# Patient Record
Sex: MALE | Race: ASIAN | NOT HISPANIC OR LATINO
[De-identification: names, ages, dates, MRNs, and addresses within clinical notes are randomized per-mention and may not be internally consistent; named-entity substitution may affect disease eponyms.]

---

## 2017-03-28 ENCOUNTER — NON-APPOINTMENT (OUTPATIENT)
Age: 47
End: 2017-03-28

## 2017-03-28 ENCOUNTER — APPOINTMENT (OUTPATIENT)
Dept: CARDIOLOGY | Facility: HOSPITAL | Age: 47
End: 2017-03-28

## 2017-03-28 ENCOUNTER — OUTPATIENT (OUTPATIENT)
Dept: OUTPATIENT SERVICES | Facility: HOSPITAL | Age: 47
LOS: 1 days | End: 2017-03-28
Payer: MEDICAID

## 2017-03-28 VITALS
SYSTOLIC BLOOD PRESSURE: 134 MMHG | HEIGHT: 68 IN | OXYGEN SATURATION: 99 % | HEART RATE: 71 BPM | DIASTOLIC BLOOD PRESSURE: 88 MMHG

## 2017-03-28 DIAGNOSIS — Z95.2 PRESENCE OF PROSTHETIC HEART VALVE: Chronic | ICD-10-CM

## 2017-03-28 DIAGNOSIS — Z98.89 OTHER SPECIFIED POSTPROCEDURAL STATES: Chronic | ICD-10-CM

## 2017-03-28 DIAGNOSIS — Z86.018 PERSONAL HISTORY OF OTHER BENIGN NEOPLASM: Chronic | ICD-10-CM

## 2017-03-28 DIAGNOSIS — I25.10 ATHEROSCLEROTIC HEART DISEASE OF NATIVE CORONARY ARTERY WITHOUT ANGINA PECTORIS: ICD-10-CM

## 2017-03-28 PROCEDURE — 93005 ELECTROCARDIOGRAM TRACING: CPT

## 2017-03-28 PROCEDURE — G0463: CPT

## 2017-03-29 DIAGNOSIS — E11.9 TYPE 2 DIABETES MELLITUS WITHOUT COMPLICATIONS: ICD-10-CM

## 2017-03-29 DIAGNOSIS — E78.5 HYPERLIPIDEMIA, UNSPECIFIED: ICD-10-CM

## 2017-06-20 ENCOUNTER — APPOINTMENT (OUTPATIENT)
Dept: CARDIOLOGY | Facility: HOSPITAL | Age: 47
End: 2017-06-20

## 2017-06-20 ENCOUNTER — OUTPATIENT (OUTPATIENT)
Dept: OUTPATIENT SERVICES | Facility: HOSPITAL | Age: 47
LOS: 1 days | End: 2017-06-20
Payer: MEDICAID

## 2017-06-20 VITALS — DIASTOLIC BLOOD PRESSURE: 87 MMHG | SYSTOLIC BLOOD PRESSURE: 132 MMHG | HEART RATE: 73 BPM | OXYGEN SATURATION: 95 %

## 2017-06-20 DIAGNOSIS — I25.10 ATHEROSCLEROTIC HEART DISEASE OF NATIVE CORONARY ARTERY WITHOUT ANGINA PECTORIS: ICD-10-CM

## 2017-06-20 DIAGNOSIS — Z86.018 PERSONAL HISTORY OF OTHER BENIGN NEOPLASM: Chronic | ICD-10-CM

## 2017-06-20 DIAGNOSIS — Z95.2 PRESENCE OF PROSTHETIC HEART VALVE: Chronic | ICD-10-CM

## 2017-06-20 DIAGNOSIS — Z98.89 OTHER SPECIFIED POSTPROCEDURAL STATES: Chronic | ICD-10-CM

## 2017-06-20 PROCEDURE — G0463: CPT

## 2017-06-22 DIAGNOSIS — I08.0 RHEUMATIC DISORDERS OF BOTH MITRAL AND AORTIC VALVES: ICD-10-CM

## 2017-06-22 DIAGNOSIS — E11.9 TYPE 2 DIABETES MELLITUS WITHOUT COMPLICATIONS: ICD-10-CM

## 2017-06-22 DIAGNOSIS — I48.92 UNSPECIFIED ATRIAL FLUTTER: ICD-10-CM

## 2017-09-13 ENCOUNTER — OUTPATIENT (OUTPATIENT)
Dept: OUTPATIENT SERVICES | Facility: HOSPITAL | Age: 47
LOS: 1 days | End: 2017-09-13
Payer: MEDICAID

## 2017-09-13 ENCOUNTER — APPOINTMENT (OUTPATIENT)
Dept: CT IMAGING | Facility: IMAGING CENTER | Age: 47
End: 2017-09-13
Payer: MEDICAID

## 2017-09-13 DIAGNOSIS — Z95.2 PRESENCE OF PROSTHETIC HEART VALVE: Chronic | ICD-10-CM

## 2017-09-13 DIAGNOSIS — Z98.89 OTHER SPECIFIED POSTPROCEDURAL STATES: Chronic | ICD-10-CM

## 2017-09-13 DIAGNOSIS — Z86.018 PERSONAL HISTORY OF OTHER BENIGN NEOPLASM: Chronic | ICD-10-CM

## 2017-09-13 DIAGNOSIS — D35.00 BENIGN NEOPLASM OF UNSPECIFIED ADRENAL GLAND: ICD-10-CM

## 2017-09-13 PROCEDURE — 82565 ASSAY OF CREATININE: CPT

## 2017-09-13 PROCEDURE — 74177 CT ABD & PELVIS W/CONTRAST: CPT

## 2017-09-13 PROCEDURE — 74177 CT ABD & PELVIS W/CONTRAST: CPT | Mod: 26

## 2017-09-13 PROCEDURE — 71260 CT THORAX DX C+: CPT | Mod: 26

## 2017-09-13 PROCEDURE — 71260 CT THORAX DX C+: CPT

## 2017-09-21 ENCOUNTER — APPOINTMENT (OUTPATIENT)
Dept: SURGICAL ONCOLOGY | Facility: CLINIC | Age: 47
End: 2017-09-21
Payer: MEDICAID

## 2017-09-21 VITALS
BODY MASS INDEX: 32.74 KG/M2 | HEART RATE: 61 BPM | SYSTOLIC BLOOD PRESSURE: 142 MMHG | RESPIRATION RATE: 16 BRPM | DIASTOLIC BLOOD PRESSURE: 93 MMHG | HEIGHT: 68 IN | OXYGEN SATURATION: 96 % | WEIGHT: 216 LBS

## 2017-09-21 PROCEDURE — 99214 OFFICE O/P EST MOD 30 MIN: CPT

## 2017-09-26 ENCOUNTER — APPOINTMENT (OUTPATIENT)
Dept: CARDIOLOGY | Facility: HOSPITAL | Age: 47
End: 2017-09-26

## 2017-09-26 ENCOUNTER — APPOINTMENT (OUTPATIENT)
Dept: SURGICAL ONCOLOGY | Facility: CLINIC | Age: 47
End: 2017-09-26

## 2017-09-26 ENCOUNTER — OUTPATIENT (OUTPATIENT)
Dept: OUTPATIENT SERVICES | Facility: HOSPITAL | Age: 47
LOS: 1 days | End: 2017-09-26
Payer: MEDICAID

## 2017-09-26 VITALS
OXYGEN SATURATION: 96 % | HEART RATE: 64 BPM | DIASTOLIC BLOOD PRESSURE: 88 MMHG | HEIGHT: 68 IN | SYSTOLIC BLOOD PRESSURE: 131 MMHG

## 2017-09-26 DIAGNOSIS — I50.23 ACUTE ON CHRONIC SYSTOLIC (CONGESTIVE) HEART FAILURE: ICD-10-CM

## 2017-09-26 DIAGNOSIS — Z86.018 PERSONAL HISTORY OF OTHER BENIGN NEOPLASM: Chronic | ICD-10-CM

## 2017-09-26 DIAGNOSIS — Z95.2 PRESENCE OF PROSTHETIC HEART VALVE: Chronic | ICD-10-CM

## 2017-09-26 DIAGNOSIS — Z98.89 OTHER SPECIFIED POSTPROCEDURAL STATES: Chronic | ICD-10-CM

## 2017-09-26 DIAGNOSIS — D35.00 BENIGN NEOPLASM OF UNSPECIFIED ADRENAL GLAND: ICD-10-CM

## 2017-09-26 DIAGNOSIS — I25.10 ATHEROSCLEROTIC HEART DISEASE OF NATIVE CORONARY ARTERY WITHOUT ANGINA PECTORIS: ICD-10-CM

## 2017-09-26 DIAGNOSIS — I48.92 UNSPECIFIED ATRIAL FLUTTER: ICD-10-CM

## 2017-09-26 PROCEDURE — G0463: CPT

## 2017-09-26 RX ORDER — INSULIN GLARGINE 100 [IU]/ML
INJECTION, SOLUTION SUBCUTANEOUS
Refills: 0 | Status: ACTIVE | COMMUNITY

## 2017-09-26 RX ORDER — FERROUS SULFATE 325(65) MG
300 (60 FE) TABLET ORAL
Refills: 0 | Status: ACTIVE | COMMUNITY

## 2017-09-26 RX ORDER — SIMVASTATIN 20 MG/1
20 TABLET, FILM COATED ORAL
Qty: 30 | Refills: 3 | Status: DISCONTINUED | COMMUNITY
Start: 2017-06-20 | End: 2017-09-26

## 2018-02-06 ENCOUNTER — NON-APPOINTMENT (OUTPATIENT)
Age: 48
End: 2018-02-06

## 2018-02-06 ENCOUNTER — APPOINTMENT (OUTPATIENT)
Dept: CARDIOLOGY | Facility: HOSPITAL | Age: 48
End: 2018-02-06

## 2018-02-06 ENCOUNTER — OUTPATIENT (OUTPATIENT)
Dept: OUTPATIENT SERVICES | Facility: HOSPITAL | Age: 48
LOS: 1 days | End: 2018-02-06
Payer: MEDICAID

## 2018-02-06 VITALS — SYSTOLIC BLOOD PRESSURE: 137 MMHG | DIASTOLIC BLOOD PRESSURE: 89 MMHG | HEART RATE: 73 BPM | OXYGEN SATURATION: 98 %

## 2018-02-06 DIAGNOSIS — D35.00 BENIGN NEOPLASM OF UNSPECIFIED ADRENAL GLAND: ICD-10-CM

## 2018-02-06 DIAGNOSIS — Z98.89 OTHER SPECIFIED POSTPROCEDURAL STATES: Chronic | ICD-10-CM

## 2018-02-06 DIAGNOSIS — Z95.2 PRESENCE OF PROSTHETIC HEART VALVE: Chronic | ICD-10-CM

## 2018-02-06 DIAGNOSIS — I25.10 ATHEROSCLEROTIC HEART DISEASE OF NATIVE CORONARY ARTERY WITHOUT ANGINA PECTORIS: ICD-10-CM

## 2018-02-06 DIAGNOSIS — Z86.018 PERSONAL HISTORY OF OTHER BENIGN NEOPLASM: Chronic | ICD-10-CM

## 2018-02-06 PROCEDURE — G0463: CPT

## 2018-02-06 PROCEDURE — 93005 ELECTROCARDIOGRAM TRACING: CPT

## 2018-02-08 DIAGNOSIS — I48.92 UNSPECIFIED ATRIAL FLUTTER: ICD-10-CM

## 2018-02-08 DIAGNOSIS — D35.00 BENIGN NEOPLASM OF UNSPECIFIED ADRENAL GLAND: ICD-10-CM

## 2018-02-08 DIAGNOSIS — I50.23 ACUTE ON CHRONIC SYSTOLIC (CONGESTIVE) HEART FAILURE: ICD-10-CM

## 2018-08-07 ENCOUNTER — APPOINTMENT (OUTPATIENT)
Dept: CARDIOLOGY | Facility: HOSPITAL | Age: 48
End: 2018-08-07

## 2018-08-07 ENCOUNTER — OUTPATIENT (OUTPATIENT)
Dept: OUTPATIENT SERVICES | Facility: HOSPITAL | Age: 48
LOS: 1 days | End: 2018-08-07
Payer: MEDICAID

## 2018-08-07 ENCOUNTER — NON-APPOINTMENT (OUTPATIENT)
Age: 48
End: 2018-08-07

## 2018-08-07 VITALS — DIASTOLIC BLOOD PRESSURE: 81 MMHG | SYSTOLIC BLOOD PRESSURE: 121 MMHG | HEART RATE: 70 BPM | OXYGEN SATURATION: 97 %

## 2018-08-07 DIAGNOSIS — Z95.2 PRESENCE OF PROSTHETIC HEART VALVE: Chronic | ICD-10-CM

## 2018-08-07 DIAGNOSIS — Z86.018 PERSONAL HISTORY OF OTHER BENIGN NEOPLASM: Chronic | ICD-10-CM

## 2018-08-07 DIAGNOSIS — Z98.89 OTHER SPECIFIED POSTPROCEDURAL STATES: Chronic | ICD-10-CM

## 2018-08-07 DIAGNOSIS — I25.10 ATHEROSCLEROTIC HEART DISEASE OF NATIVE CORONARY ARTERY WITHOUT ANGINA PECTORIS: ICD-10-CM

## 2018-08-07 PROCEDURE — 93005 ELECTROCARDIOGRAM TRACING: CPT

## 2018-08-07 PROCEDURE — G0463: CPT

## 2018-08-07 RX ORDER — PRAVASTATIN SODIUM 40 MG/1
40 TABLET ORAL
Qty: 90 | Refills: 3 | Status: DISCONTINUED | COMMUNITY
Start: 2017-09-26 | End: 2018-08-07

## 2018-08-08 DIAGNOSIS — I48.92 UNSPECIFIED ATRIAL FLUTTER: ICD-10-CM

## 2018-08-08 DIAGNOSIS — I50.23 ACUTE ON CHRONIC SYSTOLIC (CONGESTIVE) HEART FAILURE: ICD-10-CM

## 2018-09-04 ENCOUNTER — APPOINTMENT (OUTPATIENT)
Dept: CT IMAGING | Facility: IMAGING CENTER | Age: 48
End: 2018-09-04

## 2018-11-06 ENCOUNTER — APPOINTMENT (OUTPATIENT)
Dept: CARDIOLOGY | Facility: HOSPITAL | Age: 48
End: 2018-11-06

## 2018-11-06 ENCOUNTER — OUTPATIENT (OUTPATIENT)
Dept: OUTPATIENT SERVICES | Facility: HOSPITAL | Age: 48
LOS: 1 days | End: 2018-11-06
Payer: MEDICAID

## 2018-11-06 ENCOUNTER — NON-APPOINTMENT (OUTPATIENT)
Age: 48
End: 2018-11-06

## 2018-11-06 VITALS
HEART RATE: 73 BPM | OXYGEN SATURATION: 98 % | BODY MASS INDEX: 37.99 KG/M2 | HEIGHT: 65 IN | WEIGHT: 228 LBS | SYSTOLIC BLOOD PRESSURE: 146 MMHG | DIASTOLIC BLOOD PRESSURE: 88 MMHG

## 2018-11-06 VITALS — DIASTOLIC BLOOD PRESSURE: 84 MMHG | SYSTOLIC BLOOD PRESSURE: 136 MMHG

## 2018-11-06 DIAGNOSIS — Z95.2 PRESENCE OF PROSTHETIC HEART VALVE: Chronic | ICD-10-CM

## 2018-11-06 DIAGNOSIS — Z98.89 OTHER SPECIFIED POSTPROCEDURAL STATES: Chronic | ICD-10-CM

## 2018-11-06 DIAGNOSIS — Z86.018 PERSONAL HISTORY OF OTHER BENIGN NEOPLASM: Chronic | ICD-10-CM

## 2018-11-06 DIAGNOSIS — I25.10 ATHEROSCLEROTIC HEART DISEASE OF NATIVE CORONARY ARTERY WITHOUT ANGINA PECTORIS: ICD-10-CM

## 2018-11-06 PROCEDURE — G0463: CPT

## 2018-11-06 PROCEDURE — 93005 ELECTROCARDIOGRAM TRACING: CPT

## 2018-11-06 NOTE — PHYSICAL EXAM
[General Appearance - Well Developed] : well developed [Normal Appearance] : normal appearance [Well Groomed] : well groomed [General Appearance - Well Nourished] : well nourished [No Deformities] : no deformities [General Appearance - In No Acute Distress] : no acute distress [Normal Conjunctiva] : the conjunctiva exhibited no abnormalities [Eyelids - No Xanthelasma] : the eyelids demonstrated no xanthelasmas [Normal Oral Mucosa] : normal oral mucosa [No Oral Pallor] : no oral pallor [No Oral Cyanosis] : no oral cyanosis [Normal Jugular Venous A Waves Present] : normal jugular venous A waves present [Normal Jugular Venous V Waves Present] : normal jugular venous V waves present [No Jugular Venous Cruz A Waves] : no jugular venous cruz A waves [Respiration, Rhythm And Depth] : normal respiratory rhythm and effort [Exaggerated Use Of Accessory Muscles For Inspiration] : no accessory muscle use [Auscultation Breath Sounds / Voice Sounds] : lungs were clear to auscultation bilaterally [Heart Sounds] : normal S1 and S2 [Abdomen Soft] : soft [Abdomen Tenderness] : non-tender [Abdomen Mass (___ Cm)] : no abdominal mass palpated [Abnormal Walk] : normal gait [Gait - Sufficient For Exercise Testing] : the gait was sufficient for exercise testing [Nail Clubbing] : no clubbing of the fingernails [Cyanosis, Localized] : no localized cyanosis [Petechial Hemorrhages (___cm)] : no petechial hemorrhages [Skin Color & Pigmentation] : normal skin color and pigmentation [] : no rash [No Venous Stasis] : no venous stasis [Skin Lesions] : no skin lesions [No Skin Ulcers] : no skin ulcer [No Xanthoma] : no  xanthoma was observed [Oriented To Time, Place, And Person] : oriented to person, place, and time [Affect] : the affect was normal [Mood] : the mood was normal [No Anxiety] : not feeling anxious [FreeTextEntry1] : 3/6 systolic murmur heard best at the RUSB, soft diastolic murmur heard best at the apex

## 2018-11-06 NOTE — REASON FOR VISIT
[Follow-Up - Clinic] : a clinic follow-up of [FreeTextEntry1] : severe MR and AI s/p bioprosthetic AVR, MVR in 9/2017, atrial flutter s/p ablation(1/2018)

## 2018-11-06 NOTE — HISTORY OF PRESENT ILLNESS
[FreeTextEntry1] : Mr. Zelaya is a 49 yo M with a PMH significant for IDDM, HLD, HCV, pheochromocytoma s/p adrenalectomy, severe MR and AI s/p bioprosthetic AVR, MVR in 9/2017, atrial flutter s/p ablation(1/2018) who presents for follow up. \par \par Currently, patient is without complaints and states he is feeling much improved. He has been working more often because he has been feeling better and he has also been walking for 20 min per day without dyspnea. He denies chest pain, SOB, orthopnea, PND, palpitations. \par \par He recently saw his endocrinologist for his DM and his insulins were discontinued. He was started on metformin, glimepiride, and pioglitazone.\par \par Recent labs:\par \par TC 96, , HDL 29, LDL 32, Creatinine 1\par \par From prior notes: "He was discharged from [facility] in 9/2015 after a prolonged hospitalization secondary to PNA. His stay was complicated by septic and cardiogenic shock with incidental discovery of pheochromocytoma, severe mitral regurgitation and aortic regurgitation that led to hemodynamic instability secondary to AF with RVR. He was cardioverted and taken for subsequent pheochromocytoma removal as well as AVR/MVR. He is status post pheo removal as well as MVR/AVR. In 1/2016, he had atrial flutter and is now s/p aflutter ablation. He has been doing well post ablation. \par \par Had recent CT on 9/13/2017 s/p adrenalectomy with stable pulmonary nodules and no recurrence of adrenal disease."

## 2018-11-09 DIAGNOSIS — I48.92 UNSPECIFIED ATRIAL FLUTTER: ICD-10-CM

## 2018-11-09 DIAGNOSIS — I50.23 ACUTE ON CHRONIC SYSTOLIC (CONGESTIVE) HEART FAILURE: ICD-10-CM

## 2018-11-09 DIAGNOSIS — Z95.3 PRESENCE OF XENOGENIC HEART VALVE: ICD-10-CM

## 2018-11-09 DIAGNOSIS — Z86.39 PERSONAL HISTORY OF OTHER ENDOCRINE, NUTRITIONAL AND METABOLIC DISEASE: ICD-10-CM

## 2019-02-12 ENCOUNTER — APPOINTMENT (OUTPATIENT)
Dept: CARDIOLOGY | Facility: HOSPITAL | Age: 49
End: 2019-02-12

## 2019-02-12 ENCOUNTER — OUTPATIENT (OUTPATIENT)
Dept: OUTPATIENT SERVICES | Facility: HOSPITAL | Age: 49
LOS: 1 days | End: 2019-02-12
Payer: MEDICAID

## 2019-02-12 ENCOUNTER — NON-APPOINTMENT (OUTPATIENT)
Age: 49
End: 2019-02-12

## 2019-02-12 VITALS — HEART RATE: 66 BPM | DIASTOLIC BLOOD PRESSURE: 82 MMHG | OXYGEN SATURATION: 95 % | SYSTOLIC BLOOD PRESSURE: 146 MMHG

## 2019-02-12 DIAGNOSIS — I48.91 UNSPECIFIED ATRIAL FIBRILLATION: ICD-10-CM

## 2019-02-12 DIAGNOSIS — Z98.89 OTHER SPECIFIED POSTPROCEDURAL STATES: Chronic | ICD-10-CM

## 2019-02-12 DIAGNOSIS — I34.9 NONRHEUMATIC MITRAL VALVE DISORDER, UNSPECIFIED: ICD-10-CM

## 2019-02-12 DIAGNOSIS — I35.9 NONRHEUMATIC AORTIC VALVE DISORDER, UNSPECIFIED: ICD-10-CM

## 2019-02-12 DIAGNOSIS — E66.9 OBESITY, UNSPECIFIED: ICD-10-CM

## 2019-02-12 DIAGNOSIS — Z86.39 PERSONAL HISTORY OF OTHER ENDOCRINE, NUTRITIONAL AND METABOLIC DISEASE: ICD-10-CM

## 2019-02-12 DIAGNOSIS — E11.9 TYPE 2 DIABETES MELLITUS WITHOUT COMPLICATIONS: ICD-10-CM

## 2019-02-12 DIAGNOSIS — I25.10 ATHEROSCLEROTIC HEART DISEASE OF NATIVE CORONARY ARTERY WITHOUT ANGINA PECTORIS: ICD-10-CM

## 2019-02-12 DIAGNOSIS — B18.2 CHRONIC VIRAL HEPATITIS C: ICD-10-CM

## 2019-02-12 DIAGNOSIS — Z95.2 PRESENCE OF PROSTHETIC HEART VALVE: Chronic | ICD-10-CM

## 2019-02-12 DIAGNOSIS — I10 ESSENTIAL (PRIMARY) HYPERTENSION: ICD-10-CM

## 2019-02-12 DIAGNOSIS — Z86.018 PERSONAL HISTORY OF OTHER BENIGN NEOPLASM: Chronic | ICD-10-CM

## 2019-02-12 DIAGNOSIS — I50.9 HEART FAILURE, UNSPECIFIED: ICD-10-CM

## 2019-02-12 DIAGNOSIS — I50.31 ACUTE DIASTOLIC (CONGESTIVE) HEART FAILURE: ICD-10-CM

## 2019-02-12 PROCEDURE — G0463: CPT

## 2019-02-12 PROCEDURE — 93005 ELECTROCARDIOGRAM TRACING: CPT

## 2019-02-15 NOTE — HISTORY OF PRESENT ILLNESS
[FreeTextEntry1] : Mr. Zelaya is a 47 yo M with a PMH significant for IDDM, HLD, HCV, pheochromocytoma s/p adrenalectomy, severe MR and AI s/p bioprosthetic AVR, MVR in 9/2017, atrial flutter s/p ablation(1/2018) who presents for follow up. \par \par He was last seen in November and at that time felt well and no changes to his regimen were made.\par \par He currently only complains of a multiple month history of a dry cough. He spoke to his PMD and it was suggested that it may be 2/2 his enalapril. Cough is nonproductive and not associated with fevers or any other symptoms. He otherwise denies complaints and is back to work and currently walking about 20-30 minutes per day.\par \par From prior notes: "He was discharged from [facility] in 9/2015 after a prolonged hospitalization secondary to PNA. His stay was complicated by septic and cardiogenic shock with incidental discovery of pheochromocytoma, severe mitral regurgitation and aortic regurgitation that led to hemodynamic instability secondary to AF with RVR. He was cardioverted and taken for subsequent pheochromocytoma removal as well as AVR/MVR. He is status post pheo removal as well as MVR/AVR. In 1/2016, he had atrial flutter and is now s/p aflutter ablation. He has been doing well post ablation. \par \par Had recent CT on 9/13/2017 s/p adrenalectomy with stable pulmonary nodules and no recurrence of adrenal disease." \par \par Recent labs:\par 1/8/19\par total chol 99, , HDL 29, LDL 45\par creatinine 0.97, K 4.8\par reported A1C ~7s\par \par Current meds:\par [id number] mg daily\par glimepiride 4mg daily\par januvia 100mg\par metformin 750mg daily\par warfarin 7.5mg\par atorvastatin 40mg \par enalapril 5mg\par metoprolol 100mg\par

## 2019-05-21 ENCOUNTER — MEDICATION RENEWAL (OUTPATIENT)
Age: 49
End: 2019-05-21

## 2019-07-09 ENCOUNTER — APPOINTMENT (OUTPATIENT)
Dept: CARDIOLOGY | Facility: HOSPITAL | Age: 49
End: 2019-07-09

## 2019-07-10 ENCOUNTER — NON-APPOINTMENT (OUTPATIENT)
Age: 49
End: 2019-07-10

## 2019-07-10 ENCOUNTER — APPOINTMENT (OUTPATIENT)
Dept: CARDIOLOGY | Facility: HOSPITAL | Age: 49
End: 2019-07-10

## 2019-07-10 ENCOUNTER — OUTPATIENT (OUTPATIENT)
Dept: OUTPATIENT SERVICES | Facility: HOSPITAL | Age: 49
LOS: 1 days | End: 2019-07-10
Payer: MEDICAID

## 2019-07-10 VITALS
SYSTOLIC BLOOD PRESSURE: 131 MMHG | HEART RATE: 72 BPM | DIASTOLIC BLOOD PRESSURE: 83 MMHG | WEIGHT: 239 LBS | BODY MASS INDEX: 39.77 KG/M2 | OXYGEN SATURATION: 99 %

## 2019-07-10 DIAGNOSIS — Z86.018 PERSONAL HISTORY OF OTHER BENIGN NEOPLASM: Chronic | ICD-10-CM

## 2019-07-10 DIAGNOSIS — Z98.89 OTHER SPECIFIED POSTPROCEDURAL STATES: Chronic | ICD-10-CM

## 2019-07-10 DIAGNOSIS — Z82.49 FAMILY HISTORY OF ISCHEMIC HEART DISEASE AND OTHER DISEASES OF THE CIRCULATORY SYSTEM: ICD-10-CM

## 2019-07-10 DIAGNOSIS — Z87.891 PERSONAL HISTORY OF NICOTINE DEPENDENCE: ICD-10-CM

## 2019-07-10 DIAGNOSIS — Z83.3 FAMILY HISTORY OF DIABETES MELLITUS: ICD-10-CM

## 2019-07-10 DIAGNOSIS — Z86.79 PERSONAL HISTORY OF OTHER DISEASES OF THE CIRCULATORY SYSTEM: ICD-10-CM

## 2019-07-10 DIAGNOSIS — I25.10 ATHEROSCLEROTIC HEART DISEASE OF NATIVE CORONARY ARTERY WITHOUT ANGINA PECTORIS: ICD-10-CM

## 2019-07-10 DIAGNOSIS — Z95.2 PRESENCE OF PROSTHETIC HEART VALVE: Chronic | ICD-10-CM

## 2019-07-10 PROCEDURE — 93005 ELECTROCARDIOGRAM TRACING: CPT

## 2019-07-10 PROCEDURE — G0463: CPT

## 2019-07-10 RX ORDER — LOSARTAN POTASSIUM 50 MG/1
50 TABLET, FILM COATED ORAL DAILY
Qty: 30 | Refills: 3 | Status: DISCONTINUED | COMMUNITY
Start: 2019-02-12 | End: 2019-07-10

## 2019-07-12 DIAGNOSIS — R06.02 SHORTNESS OF BREATH: ICD-10-CM

## 2019-07-17 NOTE — PHYSICAL EXAM
[General Appearance - Well Developed] : well developed [Normal Appearance] : normal appearance [General Appearance - Well Nourished] : well nourished [Normal Conjunctiva] : the conjunctiva exhibited no abnormalities [Normal Oral Mucosa] : normal oral mucosa [Respiration, Rhythm And Depth] : normal respiratory rhythm and effort [] : no respiratory distress [Heart Sounds] : normal S1 and S2 [Auscultation Breath Sounds / Voice Sounds] : lungs were clear to auscultation bilaterally [Edema] : no peripheral edema present [Abdomen Soft] : soft [Abdomen Tenderness] : non-tender [Abnormal Walk] : normal gait [Cyanosis, Localized] : no localized cyanosis [Nail Clubbing] : no clubbing of the fingernails [Oriented To Time, Place, And Person] : oriented to person, place, and time [FreeTextEntry1] : 3/6 systolic murmur heard best at the RUSB, soft diastolic murmur heard best at the apex

## 2019-07-17 NOTE — HISTORY OF PRESENT ILLNESS
[FreeTextEntry1] : 49 yo M with DM2, HLD, HCV, pheochromocytoma s/p adrenalectomy, severe MR and AI s/p bioprosthetic AVR, MVR in 9/2017, atrial flutter s/p ablation (1/2018) who presents for follow up. \par \par He was last seen in Feb and at that time had cough and lisinopril was changed to losartan.\par \par Today patient notes he 'felt bad' on losartan and was put back on enalapril by his PCP. He has had no cough on the enalapril and would like to continue. He has been doing well - walks for 30min 4-5x/week and lifts light weights as well. No CP, palp, dizziness, orthopnea, PND, MARISOL. He does note some SOB when walking 'when the weather is hot' but otherwise doing okay.\par \par From prior notes: "He was discharged from [facility] in 9/2015 after a prolonged hospitalization secondary to PNA. His stay was complicated by septic and cardiogenic shock with incidental discovery of pheochromocytoma, severe mitral regurgitation and aortic regurgitation that led to hemodynamic instability secondary to AF with RVR. He was cardioverted and taken for subsequent pheochromocytoma removal as well as AVR/MVR. He is status post pheo removal as well as MVR/AVR. In 1/2016, he had atrial flutter and is now s/p aflutter ablation. He has been doing well post ablation. \par \par Had CT on 9/13/2017 s/p adrenalectomy with stable pulmonary nodules and no recurrence of adrenal disease." \par \par Recent labs:\par 1/2019\par total chol 99, , HDL 29, LDL 45\par creatinine 0.97, K 4.8\par A1C 7.3\par \par Current meds:\par asa 81mg\par warfarin 7.5mg\par atorvastatin 80mg\par enalapril 5mg BID\par metoprolol 100mg\par

## 2019-07-17 NOTE — DISCUSSION/SUMMARY
[FreeTextEntry1] : 47 yo M with DM2, HLD, HCV, pheochromocytoma s/p adrenalectomy, severe MR and AI s/p bioprosthetic AVR, MVR in 9/2015, atrial flutter s/p ablation (1/2018) who presents for follow up. \par \par Sev MR/AI s/p biophostetic AVR/MVR in 9/2015\par - unknown etiology, possible rheumatic disease given he was born in [place]\par - notes mild SOB w/ exertion when the weather is hot but no CP, palp, dizziness\par - will obtain stress echo to evaluate for ischemia as well as valvular function\par \par Aflutter s/p ablation 1/2018\par - currently NSR, was diagnosed in setting of pheochromocytoma now s/p adrenalectomy\par - will place 2 week monitor assess for AF burden\par - consider d/c anticoagulation pending results of monitor if has no further AF as risk of AC might be higher than stroke risk\par \par HTN\par - BP today 130/80, ideally would like <120/80\par - c/w metop succ 100d and enalapril 5mg BID\par - will increase enalapril at next visit if necc\par \par HLD\par - lipid panel from 1/2019, repeat labs pending w/ PCP - pt to bring to next visit\par - c/w atorva 80mg daily\par \par \par Discussed with Dr. Bowman.\par \par RTC in 3 mo.\par \par Chelsy Keller MD\par Cardiology Fellow

## 2019-07-17 NOTE — DISCUSSION/SUMMARY
[FreeTextEntry1] : 49 yo M with DM2, HLD, HCV, pheochromocytoma s/p adrenalectomy, severe MR and AI s/p bioprosthetic AVR, MVR in 9/2015, atrial flutter s/p ablation (1/2018) who presents for follow up. \par \par Sev MR/AI s/p biophostetic AVR/MVR in 9/2015\par - unknown etiology, possible rheumatic disease given he was born in [place]\par - notes mild SOB w/ exertion when the weather is hot but no CP, palp, dizziness\par - will obtain stress echo to evaluate for ischemia as well as valvular function\par \par Aflutter s/p ablation 1/2018\par - currently NSR, was diagnosed in setting of pheochromocytoma now s/p adrenalectomy\par - will place 2 week monitor assess for AF burden\par - consider d/c anticoagulation pending results of monitor if has no further AF as risk of AC might be higher than stroke risk\par \par HTN\par - BP today 130/80, ideally would like <120/80\par - c/w metop succ 100d and enalapril 5mg BID\par - will increase enalapril at next visit if necc\par \par HLD\par - lipid panel from 1/2019, repeat labs pending w/ PCP - pt to bring to next visit\par - c/w atorva 80mg daily\par \par \par Discussed with Dr. Bowman.\par \par RTC in 3 mo.\par \par Chelsy Keller MD\par Cardiology Fellow

## 2019-07-17 NOTE — PHYSICAL EXAM
[General Appearance - Well Developed] : well developed [Normal Appearance] : normal appearance [General Appearance - Well Nourished] : well nourished [Normal Conjunctiva] : the conjunctiva exhibited no abnormalities [Normal Oral Mucosa] : normal oral mucosa [] : no respiratory distress [Respiration, Rhythm And Depth] : normal respiratory rhythm and effort [Auscultation Breath Sounds / Voice Sounds] : lungs were clear to auscultation bilaterally [Heart Sounds] : normal S1 and S2 [Edema] : no peripheral edema present [Abdomen Soft] : soft [Abdomen Tenderness] : non-tender [Abnormal Walk] : normal gait [Nail Clubbing] : no clubbing of the fingernails [Cyanosis, Localized] : no localized cyanosis [Oriented To Time, Place, And Person] : oriented to person, place, and time [FreeTextEntry1] : 3/6 systolic murmur heard best at the RUSB, soft diastolic murmur heard best at the apex

## 2019-07-17 NOTE — HISTORY OF PRESENT ILLNESS
[FreeTextEntry1] : 47 yo M with DM2, HLD, HCV, pheochromocytoma s/p adrenalectomy, severe MR and AI s/p bioprosthetic AVR, MVR in 9/2017, atrial flutter s/p ablation (1/2018) who presents for follow up. \par \par He was last seen in Feb and at that time had cough and lisinopril was changed to losartan.\par \par Today patient notes he 'felt bad' on losartan and was put back on enalapril by his PCP. He has had no cough on the enalapril and would like to continue. He has been doing well - walks for 30min 4-5x/week and lifts light weights as well. No CP, palp, dizziness, orthopnea, PND, MARISOL. He does note some SOB when walking 'when the weather is hot' but otherwise doing okay.\par \par From prior notes: "He was discharged from [facility] in 9/2015 after a prolonged hospitalization secondary to PNA. His stay was complicated by septic and cardiogenic shock with incidental discovery of pheochromocytoma, severe mitral regurgitation and aortic regurgitation that led to hemodynamic instability secondary to AF with RVR. He was cardioverted and taken for subsequent pheochromocytoma removal as well as AVR/MVR. He is status post pheo removal as well as MVR/AVR. In 1/2016, he had atrial flutter and is now s/p aflutter ablation. He has been doing well post ablation. \par \par Had CT on 9/13/2017 s/p adrenalectomy with stable pulmonary nodules and no recurrence of adrenal disease." \par \par Recent labs:\par 1/2019\par total chol 99, , HDL 29, LDL 45\par creatinine 0.97, K 4.8\par A1C 7.3\par \par Current meds:\par asa 81mg\par warfarin 7.5mg\par atorvastatin 80mg\par enalapril 5mg BID\par metoprolol 100mg\par

## 2019-10-29 ENCOUNTER — APPOINTMENT (OUTPATIENT)
Dept: CARDIOLOGY | Facility: HOSPITAL | Age: 49
End: 2019-10-29

## 2019-10-29 ENCOUNTER — OUTPATIENT (OUTPATIENT)
Dept: OUTPATIENT SERVICES | Facility: HOSPITAL | Age: 49
LOS: 1 days | End: 2019-10-29
Payer: MEDICAID

## 2019-10-29 ENCOUNTER — NON-APPOINTMENT (OUTPATIENT)
Age: 49
End: 2019-10-29

## 2019-10-29 VITALS
SYSTOLIC BLOOD PRESSURE: 126 MMHG | HEIGHT: 65 IN | DIASTOLIC BLOOD PRESSURE: 92 MMHG | OXYGEN SATURATION: 97 % | HEART RATE: 165 BPM

## 2019-10-29 DIAGNOSIS — Z95.2 PRESENCE OF PROSTHETIC HEART VALVE: Chronic | ICD-10-CM

## 2019-10-29 DIAGNOSIS — Z98.89 OTHER SPECIFIED POSTPROCEDURAL STATES: Chronic | ICD-10-CM

## 2019-10-29 DIAGNOSIS — Z86.018 PERSONAL HISTORY OF OTHER BENIGN NEOPLASM: Chronic | ICD-10-CM

## 2019-10-29 DIAGNOSIS — I25.10 ATHEROSCLEROTIC HEART DISEASE OF NATIVE CORONARY ARTERY WITHOUT ANGINA PECTORIS: ICD-10-CM

## 2019-10-29 PROCEDURE — 93005 ELECTROCARDIOGRAM TRACING: CPT

## 2019-10-29 PROCEDURE — G0463: CPT

## 2019-10-29 NOTE — HISTORY OF PRESENT ILLNESS
[FreeTextEntry1] : Mr. Zelaya is a 48 yo M with a PMH significant for IDDM, HLD, HCV, pheochromocytoma s/p adrenalectomy, severe MR and AI s/p bioprosthetic AVR, MVR in 9/2017, atrial flutter s/p ablation(1/2018) who presents for follow up.\par \par Patient was recently seen in July 2019 by Dr Keller and at that time Ziopatch was ordered to assess burden of AF. It revealed 10 beats NSVT as well as a 5 beat run of AF. Otherwise, patient was in NSR. Stress echo was also ordered to assess valvular disease and function given mild MORENO.\par \par Today, patient notes his dyspnea has improved however he is still experiencing intermittent MORENO when walking long distances ie > 20 minutes. He is now able to work 12-14 hours as a  which he is very happy about. He denies palpitations, baseline SOB, chest pain, LE edema. He reports right calf pain after prolonged walking which he attributes to cramping.\par \par From prior notes: "He was discharged from [facility] in 9/2015 after a prolonged hospitalization secondary to PNA. His stay was complicated by septic and cardiogenic shock with incidental discovery of pheochromocytoma, severe mitral regurgitation and aortic regurgitation that led to hemodynamic instability secondary to AF with RVR. He was cardioverted and taken for subsequent pheochromocytoma removal as well as AVR/MVR. He is status post pheo removal as well as MVR/AVR. In 1/2016, he had atrial flutter and is now s/p aflutter ablation. He has been doing well post ablation.

## 2019-10-29 NOTE — PHYSICAL EXAM
[General Appearance - Well Developed] : well developed [Normal Appearance] : normal appearance [Well Groomed] : well groomed [General Appearance - Well Nourished] : well nourished [No Deformities] : no deformities [General Appearance - In No Acute Distress] : no acute distress [Normal Conjunctiva] : the conjunctiva exhibited no abnormalities [Eyelids - No Xanthelasma] : the eyelids demonstrated no xanthelasmas [Normal Oral Mucosa] : normal oral mucosa [No Oral Pallor] : no oral pallor [No Oral Cyanosis] : no oral cyanosis [Respiration, Rhythm And Depth] : normal respiratory rhythm and effort [Exaggerated Use Of Accessory Muscles For Inspiration] : no accessory muscle use [Auscultation Breath Sounds / Voice Sounds] : lungs were clear to auscultation bilaterally [Abdomen Soft] : soft [Abdomen Tenderness] : non-tender [Abdomen Mass (___ Cm)] : no abdominal mass palpated [Abnormal Walk] : normal gait [Gait - Sufficient For Exercise Testing] : the gait was sufficient for exercise testing [Nail Clubbing] : no clubbing of the fingernails [Cyanosis, Localized] : no localized cyanosis [Petechial Hemorrhages (___cm)] : no petechial hemorrhages [Skin Color & Pigmentation] : normal skin color and pigmentation [] : no rash [No Venous Stasis] : no venous stasis [Skin Lesions] : no skin lesions [No Skin Ulcers] : no skin ulcer [No Xanthoma] : no  xanthoma was observed [Oriented To Time, Place, And Person] : oriented to person, place, and time [Affect] : the affect was normal [Mood] : the mood was normal [No Anxiety] : not feeling anxious [FreeTextEntry1] : irregularly irregular, tachycardic, systolic murmur with varying intensity

## 2019-11-01 DIAGNOSIS — R06.02 SHORTNESS OF BREATH: ICD-10-CM

## 2019-11-01 DIAGNOSIS — I48.91 UNSPECIFIED ATRIAL FIBRILLATION: ICD-10-CM

## 2019-11-05 ENCOUNTER — OUTPATIENT (OUTPATIENT)
Dept: OUTPATIENT SERVICES | Facility: HOSPITAL | Age: 49
LOS: 1 days | End: 2019-11-05
Payer: MEDICAID

## 2019-11-05 ENCOUNTER — APPOINTMENT (OUTPATIENT)
Dept: CARDIOLOGY | Facility: HOSPITAL | Age: 49
End: 2019-11-05

## 2019-11-05 ENCOUNTER — NON-APPOINTMENT (OUTPATIENT)
Age: 49
End: 2019-11-05

## 2019-11-05 VITALS
WEIGHT: 220 LBS | DIASTOLIC BLOOD PRESSURE: 85 MMHG | OXYGEN SATURATION: 97 % | HEIGHT: 65 IN | HEART RATE: 66 BPM | BODY MASS INDEX: 36.65 KG/M2 | SYSTOLIC BLOOD PRESSURE: 154 MMHG

## 2019-11-05 DIAGNOSIS — Z98.89 OTHER SPECIFIED POSTPROCEDURAL STATES: Chronic | ICD-10-CM

## 2019-11-05 DIAGNOSIS — Z95.2 PRESENCE OF PROSTHETIC HEART VALVE: Chronic | ICD-10-CM

## 2019-11-05 DIAGNOSIS — Z86.018 PERSONAL HISTORY OF OTHER BENIGN NEOPLASM: Chronic | ICD-10-CM

## 2019-11-05 DIAGNOSIS — I25.10 ATHEROSCLEROTIC HEART DISEASE OF NATIVE CORONARY ARTERY WITHOUT ANGINA PECTORIS: ICD-10-CM

## 2019-11-05 PROCEDURE — G0463: CPT

## 2019-11-05 PROCEDURE — 93005 ELECTROCARDIOGRAM TRACING: CPT

## 2019-11-05 RX ORDER — METOPROLOL SUCCINATE 50 MG/1
50 TABLET, EXTENDED RELEASE ORAL DAILY
Qty: 30 | Refills: 2 | Status: DISCONTINUED | COMMUNITY
Start: 2019-10-29 | End: 2019-11-05

## 2019-11-06 DIAGNOSIS — I48.91 UNSPECIFIED ATRIAL FIBRILLATION: ICD-10-CM

## 2019-11-07 NOTE — HISTORY OF PRESENT ILLNESS
[FreeTextEntry1] : Mr. Zelaya is a 48 yo M with a PMH significant for IDDM, HLD, HCV, pheochromocytoma s/p adrenalectomy, severe MR and AI s/p bioprosthetic AVR, MVR in 9/2017, atrial flutter s/p ablation(1/2018) who presents for follow up after recent visit with AF with RVR. Patient seen last week and noted to be in AF with RVR up to 160s-180s. He refused to go to the ED at the time and instead chose to follow up today. He has felt well over the past week and was unable to obtain the extra dose of metoprolol that was prescribed given a misunderstanding at the pharmacy. Currently, he is in sinus rhythm and is controlled.\par \par \par From prior notes: "He was discharged from [facility] in 9/2015 after a prolonged hospitalization secondary to PNA. His stay was complicated by septic and cardiogenic shock with incidental discovery of pheochromocytoma, severe mitral regurgitation and aortic regurgitation that led to hemodynamic instability secondary to AF with RVR. He was cardioverted and taken for subsequent pheochromocytoma removal as well as AVR/MVR. He is status post pheo removal as well as MVR/AVR. In 1/2016, he had atrial flutter and is now s/p aflutter ablation. He has been doing well post ablation.

## 2019-11-07 NOTE — PHYSICAL EXAM
[General Appearance - Well Developed] : well developed [Normal Appearance] : normal appearance [Well Groomed] : well groomed [General Appearance - Well Nourished] : well nourished [No Deformities] : no deformities [General Appearance - In No Acute Distress] : no acute distress [Normal Conjunctiva] : the conjunctiva exhibited no abnormalities [Eyelids - No Xanthelasma] : the eyelids demonstrated no xanthelasmas [Normal Oral Mucosa] : normal oral mucosa [No Oral Pallor] : no oral pallor [No Oral Cyanosis] : no oral cyanosis [Respiration, Rhythm And Depth] : normal respiratory rhythm and effort [Exaggerated Use Of Accessory Muscles For Inspiration] : no accessory muscle use [Auscultation Breath Sounds / Voice Sounds] : lungs were clear to auscultation bilaterally [Heart Rate And Rhythm] : heart rate and rhythm were normal [Heart Sounds] : normal S1 and S2 [Murmurs] : no murmurs present [Abdomen Soft] : soft [Abdomen Tenderness] : non-tender [Abdomen Mass (___ Cm)] : no abdominal mass palpated [Abnormal Walk] : normal gait [Gait - Sufficient For Exercise Testing] : the gait was sufficient for exercise testing [Nail Clubbing] : no clubbing of the fingernails [Cyanosis, Localized] : no localized cyanosis [Petechial Hemorrhages (___cm)] : no petechial hemorrhages [Skin Color & Pigmentation] : normal skin color and pigmentation [] : no rash [No Venous Stasis] : no venous stasis [Skin Lesions] : no skin lesions [No Skin Ulcers] : no skin ulcer [No Xanthoma] : no  xanthoma was observed [Oriented To Time, Place, And Person] : oriented to person, place, and time [Affect] : the affect was normal [Mood] : the mood was normal [No Anxiety] : not feeling anxious [FreeTextEntry1] : 3/6 systolic murmur heard best at the RUSB, soft diastolic murmur heard best at the apex.

## 2020-02-11 ENCOUNTER — OUTPATIENT (OUTPATIENT)
Dept: OUTPATIENT SERVICES | Facility: HOSPITAL | Age: 50
LOS: 1 days | End: 2020-02-11
Payer: MEDICAID

## 2020-02-11 ENCOUNTER — APPOINTMENT (OUTPATIENT)
Dept: CARDIOLOGY | Facility: HOSPITAL | Age: 50
End: 2020-02-11

## 2020-02-11 ENCOUNTER — NON-APPOINTMENT (OUTPATIENT)
Age: 50
End: 2020-02-11

## 2020-02-11 VITALS
WEIGHT: 222 LBS | BODY MASS INDEX: 36.99 KG/M2 | SYSTOLIC BLOOD PRESSURE: 123 MMHG | HEART RATE: 67 BPM | OXYGEN SATURATION: 96 % | DIASTOLIC BLOOD PRESSURE: 78 MMHG | HEIGHT: 65 IN

## 2020-02-11 DIAGNOSIS — E78.5 HYPERLIPIDEMIA, UNSPECIFIED: ICD-10-CM

## 2020-02-11 DIAGNOSIS — Z95.2 PRESENCE OF PROSTHETIC HEART VALVE: Chronic | ICD-10-CM

## 2020-02-11 DIAGNOSIS — Z98.89 OTHER SPECIFIED POSTPROCEDURAL STATES: Chronic | ICD-10-CM

## 2020-02-11 DIAGNOSIS — Z86.018 PERSONAL HISTORY OF OTHER BENIGN NEOPLASM: Chronic | ICD-10-CM

## 2020-02-11 DIAGNOSIS — I25.10 ATHEROSCLEROTIC HEART DISEASE OF NATIVE CORONARY ARTERY WITHOUT ANGINA PECTORIS: ICD-10-CM

## 2020-02-11 PROCEDURE — G0463: CPT

## 2020-02-11 PROCEDURE — 93005 ELECTROCARDIOGRAM TRACING: CPT

## 2020-02-14 NOTE — HISTORY OF PRESENT ILLNESS
[FreeTextEntry1] : Mr. Zelaya is a 50 yo M with a PMH significant for IDDM, HLD, HCV, pheochromocytoma s/p adrenalectomy, severe MR and AI s/p bioprosthetic AVR, MVR in 9/2017, atrial flutter s/p ablation(1/2018) who presents for follow up. Patient has been experiencing symptoms of a URI recently and saw his PMD today prior to our visit and was prescribed cough medication. He has otherwise been feeling well and notes that he has been working full time. He has no cardiac complaints including palpitations, SOB, MORENO, chest pain. He notes LE edema in his right leg which he has experienced since a prior leg injury.\par \par Recent non-fasting labs in January 2020\par TC 97\par \par HDL 26\par LDL 3\par A1C 7.3\par \par From prior notes: "He was discharged from [facility] in 9/2015 after a prolonged hospitalization secondary to PNA. His stay was complicated by septic and cardiogenic shock with incidental discovery of pheochromocytoma, severe mitral regurgitation and aortic regurgitation that led to hemodynamic instability secondary to AF with RVR. He was cardioverted and taken for subsequent pheochromocytoma removal as well as AVR/MVR. He is status post pheo removal as well as MVR/AVR. In 1/2016, he had atrial flutter and is now s/p aflutter ablation. He has been doing well post ablation.

## 2020-02-14 NOTE — PHYSICAL EXAM
[Normal Appearance] : normal appearance [General Appearance - Well Developed] : well developed [Well Groomed] : well groomed [No Deformities] : no deformities [General Appearance - Well Nourished] : well nourished [Normal Conjunctiva] : the conjunctiva exhibited no abnormalities [General Appearance - In No Acute Distress] : no acute distress [Eyelids - No Xanthelasma] : the eyelids demonstrated no xanthelasmas [Normal Oral Mucosa] : normal oral mucosa [No Oral Pallor] : no oral pallor [No Oral Cyanosis] : no oral cyanosis [Respiration, Rhythm And Depth] : normal respiratory rhythm and effort [Exaggerated Use Of Accessory Muscles For Inspiration] : no accessory muscle use [Auscultation Breath Sounds / Voice Sounds] : lungs were clear to auscultation bilaterally [Heart Rate And Rhythm] : heart rate and rhythm were normal [Heart Sounds] : normal S1 and S2 [Murmurs] : no murmurs present [Abdomen Soft] : soft [Abdomen Tenderness] : non-tender [Abnormal Walk] : normal gait [Abdomen Mass (___ Cm)] : no abdominal mass palpated [Nail Clubbing] : no clubbing of the fingernails [Cyanosis, Localized] : no localized cyanosis [Gait - Sufficient For Exercise Testing] : the gait was sufficient for exercise testing [Skin Color & Pigmentation] : normal skin color and pigmentation [Petechial Hemorrhages (___cm)] : no petechial hemorrhages [] : no rash [No Venous Stasis] : no venous stasis [No Skin Ulcers] : no skin ulcer [Skin Lesions] : no skin lesions [Oriented To Time, Place, And Person] : oriented to person, place, and time [No Xanthoma] : no  xanthoma was observed [Mood] : the mood was normal [Affect] : the affect was normal [No Anxiety] : not feeling anxious [FreeTextEntry1] : 3/6 systolic murmur heard best at the RUSB, 3/6 systolic murmur at the apex

## 2020-02-19 DIAGNOSIS — I48.91 UNSPECIFIED ATRIAL FIBRILLATION: ICD-10-CM

## 2020-02-19 DIAGNOSIS — I10 ESSENTIAL (PRIMARY) HYPERTENSION: ICD-10-CM

## 2020-02-19 DIAGNOSIS — E78.5 HYPERLIPIDEMIA, UNSPECIFIED: ICD-10-CM

## 2020-05-12 ENCOUNTER — APPOINTMENT (OUTPATIENT)
Dept: CARDIOLOGY | Facility: HOSPITAL | Age: 50
End: 2020-05-12

## 2020-06-30 ENCOUNTER — NON-APPOINTMENT (OUTPATIENT)
Age: 50
End: 2020-06-30

## 2020-06-30 ENCOUNTER — OUTPATIENT (OUTPATIENT)
Dept: OUTPATIENT SERVICES | Facility: HOSPITAL | Age: 50
LOS: 1 days | End: 2020-06-30
Payer: MEDICAID

## 2020-06-30 ENCOUNTER — APPOINTMENT (OUTPATIENT)
Dept: CARDIOLOGY | Facility: HOSPITAL | Age: 50
End: 2020-06-30

## 2020-06-30 VITALS
HEIGHT: 65 IN | OXYGEN SATURATION: 100 % | HEART RATE: 65 BPM | DIASTOLIC BLOOD PRESSURE: 83 MMHG | SYSTOLIC BLOOD PRESSURE: 131 MMHG

## 2020-06-30 DIAGNOSIS — Z98.89 OTHER SPECIFIED POSTPROCEDURAL STATES: Chronic | ICD-10-CM

## 2020-06-30 DIAGNOSIS — I25.10 ATHEROSCLEROTIC HEART DISEASE OF NATIVE CORONARY ARTERY WITHOUT ANGINA PECTORIS: ICD-10-CM

## 2020-06-30 DIAGNOSIS — Z95.2 PRESENCE OF PROSTHETIC HEART VALVE: Chronic | ICD-10-CM

## 2020-06-30 DIAGNOSIS — Z86.018 PERSONAL HISTORY OF OTHER BENIGN NEOPLASM: Chronic | ICD-10-CM

## 2020-06-30 PROCEDURE — G0463: CPT

## 2020-06-30 PROCEDURE — 93005 ELECTROCARDIOGRAM TRACING: CPT

## 2020-06-30 NOTE — HISTORY OF PRESENT ILLNESS
[FreeTextEntry1] : Mr. Zelaya is a 48 yo M with a PMH significant for severe MR and AI s/p bioprosthetic AVR, MVR in 9/2017, atrial flutter s/p ablation(1/2018), IDDM, HLD, HCV, pheochromocytoma s/p adrenalectomy who presents for follow up.\par \par \par Patient was last seen in February and since then has felt well. He has been unable to work due to COVID and lack of customers(works as ) but has tried to remain active. He walks 15-30 minutes daily, does light weightlifting and 5-7 minutes of stationary bike. He is eager to start biking outside.\par \par With respect to his INR, it is checked by his PMD and most recently was 2.1 in early June. Rest of labs listed below:\par \par lipid panel:\par TC 89\par \par HDL 29\par LDL 39\par \par Cr 1.18\par A1C 7.7\par \par From prior notes: "He was discharged from [facility] in 9/2015 after a prolonged hospitalization secondary to PNA. His stay was complicated by septic and cardiogenic shock with incidental discovery of pheochromocytoma, severe mitral regurgitation and aortic regurgitation and hemodynamic instability secondary to AF with RVR. He was cardioverted and taken for subsequent pheochromocytoma removal as well as AVR/MVR. He is status post pheo removal as well as MVR/AVR. In 1/2016, he had atrial flutter and is now s/p aflutter ablation. He has been doing well post ablation.

## 2020-06-30 NOTE — PHYSICAL EXAM
[General Appearance - Well Developed] : well developed [Normal Appearance] : normal appearance [Well Groomed] : well groomed [No Deformities] : no deformities [General Appearance - Well Nourished] : well nourished [General Appearance - In No Acute Distress] : no acute distress [Normal Conjunctiva] : the conjunctiva exhibited no abnormalities [Eyelids - No Xanthelasma] : the eyelids demonstrated no xanthelasmas [Normal Oral Mucosa] : normal oral mucosa [No Oral Pallor] : no oral pallor [No Oral Cyanosis] : no oral cyanosis [Respiration, Rhythm And Depth] : normal respiratory rhythm and effort [Exaggerated Use Of Accessory Muscles For Inspiration] : no accessory muscle use [Auscultation Breath Sounds / Voice Sounds] : lungs were clear to auscultation bilaterally [Heart Rate And Rhythm] : heart rate and rhythm were normal [Heart Sounds] : normal S1 and S2 [Murmurs] : no murmurs present [Abdomen Soft] : soft [Abdomen Tenderness] : non-tender [Abdomen Mass (___ Cm)] : no abdominal mass palpated [Gait - Sufficient For Exercise Testing] : the gait was sufficient for exercise testing [Abnormal Walk] : normal gait [Nail Clubbing] : no clubbing of the fingernails [Cyanosis, Localized] : no localized cyanosis [Petechial Hemorrhages (___cm)] : no petechial hemorrhages [] : no rash [Skin Color & Pigmentation] : normal skin color and pigmentation [Skin Lesions] : no skin lesions [No Skin Ulcers] : no skin ulcer [No Venous Stasis] : no venous stasis [No Xanthoma] : no  xanthoma was observed [Oriented To Time, Place, And Person] : oriented to person, place, and time [Affect] : the affect was normal [Mood] : the mood was normal [No Anxiety] : not feeling anxious [FreeTextEntry1] : 3/6 systolic murmur heard best at the RUSB, 3/6 systolic murmur at the apex

## 2020-07-02 DIAGNOSIS — I48.91 UNSPECIFIED ATRIAL FIBRILLATION: ICD-10-CM

## 2020-07-02 DIAGNOSIS — I10 ESSENTIAL (PRIMARY) HYPERTENSION: ICD-10-CM

## 2020-09-29 ENCOUNTER — OUTPATIENT (OUTPATIENT)
Dept: OUTPATIENT SERVICES | Facility: HOSPITAL | Age: 50
LOS: 1 days | End: 2020-09-29
Payer: MEDICAID

## 2020-09-29 ENCOUNTER — NON-APPOINTMENT (OUTPATIENT)
Age: 50
End: 2020-09-29

## 2020-09-29 ENCOUNTER — APPOINTMENT (OUTPATIENT)
Dept: CARDIOLOGY | Facility: HOSPITAL | Age: 50
End: 2020-09-29

## 2020-09-29 VITALS
WEIGHT: 240 LBS | HEIGHT: 65 IN | BODY MASS INDEX: 39.99 KG/M2 | OXYGEN SATURATION: 97 % | HEART RATE: 64 BPM | SYSTOLIC BLOOD PRESSURE: 143 MMHG | DIASTOLIC BLOOD PRESSURE: 84 MMHG

## 2020-09-29 DIAGNOSIS — I25.10 ATHEROSCLEROTIC HEART DISEASE OF NATIVE CORONARY ARTERY WITHOUT ANGINA PECTORIS: ICD-10-CM

## 2020-09-29 DIAGNOSIS — Z95.2 PRESENCE OF PROSTHETIC HEART VALVE: Chronic | ICD-10-CM

## 2020-09-29 DIAGNOSIS — Z98.89 OTHER SPECIFIED POSTPROCEDURAL STATES: Chronic | ICD-10-CM

## 2020-09-29 DIAGNOSIS — Z86.018 PERSONAL HISTORY OF OTHER BENIGN NEOPLASM: Chronic | ICD-10-CM

## 2020-09-29 PROCEDURE — 93005 ELECTROCARDIOGRAM TRACING: CPT

## 2020-09-29 PROCEDURE — G0463: CPT

## 2020-09-29 NOTE — PHYSICAL EXAM
[General Appearance - Well Developed] : well developed [Normal Appearance] : normal appearance [Well Groomed] : well groomed [General Appearance - Well Nourished] : well nourished [No Deformities] : no deformities [General Appearance - In No Acute Distress] : no acute distress [Normal Conjunctiva] : the conjunctiva exhibited no abnormalities [Eyelids - No Xanthelasma] : the eyelids demonstrated no xanthelasmas [Normal Oral Mucosa] : normal oral mucosa [No Oral Pallor] : no oral pallor [No Oral Cyanosis] : no oral cyanosis [Respiration, Rhythm And Depth] : normal respiratory rhythm and effort [Exaggerated Use Of Accessory Muscles For Inspiration] : no accessory muscle use [Auscultation Breath Sounds / Voice Sounds] : lungs were clear to auscultation bilaterally [Heart Rate And Rhythm] : heart rate and rhythm were normal [Heart Sounds] : normal S1 and S2 [Murmurs] : no murmurs present [Abdomen Soft] : soft [Abdomen Tenderness] : non-tender [Abdomen Mass (___ Cm)] : no abdominal mass palpated [Abnormal Walk] : normal gait [Gait - Sufficient For Exercise Testing] : the gait was sufficient for exercise testing [Nail Clubbing] : no clubbing of the fingernails [Cyanosis, Localized] : no localized cyanosis [Petechial Hemorrhages (___cm)] : no petechial hemorrhages [Skin Color & Pigmentation] : normal skin color and pigmentation [] : no rash [No Venous Stasis] : no venous stasis [Skin Lesions] : no skin lesions [No Skin Ulcers] : no skin ulcer [No Xanthoma] : no  xanthoma was observed [Oriented To Time, Place, And Person] : oriented to person, place, and time [Affect] : the affect was normal [Mood] : the mood was normal [No Anxiety] : not feeling anxious [FreeTextEntry1] : 3/6 systolic murmur heard best at the RUSB, 3/6 systolic murmur at the apex

## 2020-09-30 DIAGNOSIS — I48.91 UNSPECIFIED ATRIAL FIBRILLATION: ICD-10-CM

## 2020-09-30 DIAGNOSIS — I10 ESSENTIAL (PRIMARY) HYPERTENSION: ICD-10-CM

## 2021-01-05 ENCOUNTER — OUTPATIENT (OUTPATIENT)
Dept: OUTPATIENT SERVICES | Facility: HOSPITAL | Age: 51
LOS: 1 days | End: 2021-01-05
Payer: MEDICAID

## 2021-01-05 ENCOUNTER — APPOINTMENT (OUTPATIENT)
Dept: CARDIOLOGY | Facility: HOSPITAL | Age: 51
End: 2021-01-05

## 2021-01-05 VITALS — SYSTOLIC BLOOD PRESSURE: 152 MMHG | DIASTOLIC BLOOD PRESSURE: 81 MMHG | OXYGEN SATURATION: 99 % | HEART RATE: 59 BPM

## 2021-01-05 VITALS — DIASTOLIC BLOOD PRESSURE: 81 MMHG | HEART RATE: 59 BPM | SYSTOLIC BLOOD PRESSURE: 138 MMHG

## 2021-01-05 DIAGNOSIS — I25.10 ATHEROSCLEROTIC HEART DISEASE OF NATIVE CORONARY ARTERY WITHOUT ANGINA PECTORIS: ICD-10-CM

## 2021-01-05 DIAGNOSIS — Z98.89 OTHER SPECIFIED POSTPROCEDURAL STATES: Chronic | ICD-10-CM

## 2021-01-05 DIAGNOSIS — Z86.018 PERSONAL HISTORY OF OTHER BENIGN NEOPLASM: Chronic | ICD-10-CM

## 2021-01-05 DIAGNOSIS — Z95.2 PRESENCE OF PROSTHETIC HEART VALVE: Chronic | ICD-10-CM

## 2021-01-05 PROCEDURE — 93005 ELECTROCARDIOGRAM TRACING: CPT

## 2021-01-05 PROCEDURE — G0463: CPT

## 2021-01-08 DIAGNOSIS — I10 ESSENTIAL (PRIMARY) HYPERTENSION: ICD-10-CM

## 2021-01-08 DIAGNOSIS — I48.91 UNSPECIFIED ATRIAL FIBRILLATION: ICD-10-CM

## 2021-01-11 NOTE — HISTORY OF PRESENT ILLNESS
[FreeTextEntry1] : Mr. Zelaya is a 49 yo M with a PMH significant for severe MR and AI s/p bioprosthetic AVR, MVR in 9/2015, atrial flutter s/p ablation(1/2018), IDDM, HLD, HCV, pheochromocytoma s/p adrenalectomy who presents for follow up.\par \par Patient was last seen in Sept 2020 and felt well at the time. Since then, he has continued to feel well however is concerned about his weight gain. He notes he has tried to exercise intermittently but has not been able to maintain a good exercise regimen. He walks for 30 minutes about 2-3x per week, but may then last a couple of weeks without walking. With respect to his diet, he limits fried foods but has been eating starch/carbs with chicken. He denies MORENO, SOB, chest pain, orthopnea, LE edema.\par \par Recent labs:\par Dec 2020\par A1C 8.1\par \par \par \par HDL 28\par \par Cr 1.25\par INR 1.5-2.1\par \par \par From prior notes: "He was discharged from [facility] in 9/2015 after a prolonged hospitalization secondary to PNA. His stay was complicated by septic and cardiogenic shock with incidental discovery of pheochromocytoma, severe mitral regurgitation and aortic regurgitation and hemodynamic instability secondary to AF with RVR. He was cardioverted and taken for subsequent pheochromocytoma removal as well as AVR/MVR. He is status post pheo removal as well as MVR/AVR. In 1/2016, he had atrial flutter and is now s/p aflutter ablation. He has been doing well post ablation. "\par \par Also Holter monitor in 2019 with brief episodes of AFib.

## 2021-04-27 ENCOUNTER — NON-APPOINTMENT (OUTPATIENT)
Age: 51
End: 2021-04-27

## 2021-04-27 ENCOUNTER — APPOINTMENT (OUTPATIENT)
Dept: CARDIOLOGY | Facility: HOSPITAL | Age: 51
End: 2021-04-27

## 2021-04-27 ENCOUNTER — OUTPATIENT (OUTPATIENT)
Dept: OUTPATIENT SERVICES | Facility: HOSPITAL | Age: 51
LOS: 1 days | End: 2021-04-27
Payer: MEDICAID

## 2021-04-27 VITALS
DIASTOLIC BLOOD PRESSURE: 83 MMHG | HEART RATE: 60 BPM | BODY MASS INDEX: 40.32 KG/M2 | SYSTOLIC BLOOD PRESSURE: 128 MMHG | WEIGHT: 242 LBS | OXYGEN SATURATION: 98 % | HEIGHT: 65 IN

## 2021-04-27 DIAGNOSIS — Z86.018 PERSONAL HISTORY OF OTHER BENIGN NEOPLASM: Chronic | ICD-10-CM

## 2021-04-27 DIAGNOSIS — Z95.2 PRESENCE OF PROSTHETIC HEART VALVE: Chronic | ICD-10-CM

## 2021-04-27 DIAGNOSIS — Z98.89 OTHER SPECIFIED POSTPROCEDURAL STATES: Chronic | ICD-10-CM

## 2021-04-27 DIAGNOSIS — I25.10 ATHEROSCLEROTIC HEART DISEASE OF NATIVE CORONARY ARTERY WITHOUT ANGINA PECTORIS: ICD-10-CM

## 2021-04-27 PROCEDURE — G0463: CPT

## 2021-04-27 PROCEDURE — 93005 ELECTROCARDIOGRAM TRACING: CPT

## 2021-04-28 DIAGNOSIS — I48.91 UNSPECIFIED ATRIAL FIBRILLATION: ICD-10-CM

## 2021-04-28 DIAGNOSIS — I10 ESSENTIAL (PRIMARY) HYPERTENSION: ICD-10-CM

## 2021-04-29 NOTE — PHYSICAL EXAM
"  Problem: General Rehab Plan of Care  Goal: Occupational Therapy Goals  Description  The patient and/or their representative will achieve their patient-specific goals related to the plan of care.  The patient-specific goals include:    Interventions to focus on decreasing symptoms of depression,  decreasing self-injurious behaviors, elimination of suicidal ideation and elevation of mood. Additional interventions to focus on identifying and managing feelings, stress management, exercise, and healthy coping skills.     Pt attended and participated in a structured occupational therapy group session with a focus on coping through through task: painting window cling projects x20 min d/t pt leaving group early and not returning (no charge).  During check-in, pt reported feeling \"bad\". Pt was able to initiate task and ask for help as needed. Pt worked to complete one window cling but declined making other's or suggestions offered by therapist. Appeared distant, flat and fatigued. No negative behaviors noted.     Outcome: No Change     " [General Appearance - Well Developed] : well developed [Normal Appearance] : normal appearance [Well Groomed] : well groomed [General Appearance - Well Nourished] : well nourished [No Deformities] : no deformities [General Appearance - In No Acute Distress] : no acute distress [Normal Conjunctiva] : the conjunctiva exhibited no abnormalities [Eyelids - No Xanthelasma] : the eyelids demonstrated no xanthelasmas [Normal Oral Mucosa] : normal oral mucosa [No Oral Pallor] : no oral pallor [No Oral Cyanosis] : no oral cyanosis [Respiration, Rhythm And Depth] : normal respiratory rhythm and effort [Exaggerated Use Of Accessory Muscles For Inspiration] : no accessory muscle use [Auscultation Breath Sounds / Voice Sounds] : lungs were clear to auscultation bilaterally [Heart Rate And Rhythm] : heart rate and rhythm were normal [Heart Sounds] : normal S1 and S2 [Murmurs] : no murmurs present [Abdomen Soft] : soft [Abdomen Tenderness] : non-tender [Abdomen Mass (___ Cm)] : no abdominal mass palpated [Abnormal Walk] : normal gait [Gait - Sufficient For Exercise Testing] : the gait was sufficient for exercise testing [Nail Clubbing] : no clubbing of the fingernails [Cyanosis, Localized] : no localized cyanosis [Petechial Hemorrhages (___cm)] : no petechial hemorrhages [Skin Color & Pigmentation] : normal skin color and pigmentation [] : no rash [No Venous Stasis] : no venous stasis [Skin Lesions] : no skin lesions [No Skin Ulcers] : no skin ulcer [No Xanthoma] : no  xanthoma was observed [Oriented To Time, Place, And Person] : oriented to person, place, and time [Affect] : the affect was normal [Mood] : the mood was normal [No Anxiety] : not feeling anxious [FreeTextEntry1] : 3/6 systolic murmur heard best at the RUSB, 3/6 systolic murmur at the apex

## 2021-04-29 NOTE — HISTORY OF PRESENT ILLNESS
[FreeTextEntry1] : Mr. Zelaya is a 51 yo M with a PMH significant for severe MR and AI s/p bioprosthetic AVR, MVR in 9/2015, atrial flutter s/p ablation(1/2018), IDDM, HLD, HCV, pheochromocytoma s/p adrenalectomy who presents for follow up.\par \par Patient has been feeling well and has no complaints. He has been walking 30 minutes daily and has felt well. He recently followed up with his PMD and got bloodwork:\par Cr 1.25\par INR 1.3\par HbA1C 7.4\par \par \par Recent labs:\par Dec 2020\par A1C 8.1\par \par \par \par HDL 28\par \par Cr 1.25\par INR 1.5-2.1\par \par \par From prior notes: "He was discharged from [facility] in 9/2015 after a prolonged hospitalization secondary to PNA. His stay was complicated by septic and cardiogenic shock with incidental discovery of pheochromocytoma, severe mitral regurgitation and aortic regurgitation and hemodynamic instability secondary to AF with RVR. He was cardioverted and taken for subsequent pheochromocytoma removal as well as AVR/MVR. He is status post pheo removal as well as MVR/AVR. In 1/2016, he had atrial flutter and is now s/p aflutter ablation. He has been doing well post ablation. "\par \par Also Holter monitor in 2019 with brief episodes of AFib.

## 2021-06-01 ENCOUNTER — APPOINTMENT (OUTPATIENT)
Dept: CARDIOLOGY | Facility: HOSPITAL | Age: 51
End: 2021-06-01

## 2021-06-01 ENCOUNTER — OUTPATIENT (OUTPATIENT)
Dept: OUTPATIENT SERVICES | Facility: HOSPITAL | Age: 51
LOS: 1 days | End: 2021-06-01
Payer: MEDICAID

## 2021-06-01 VITALS
BODY MASS INDEX: 40.65 KG/M2 | HEART RATE: 64 BPM | OXYGEN SATURATION: 98 % | HEIGHT: 65 IN | DIASTOLIC BLOOD PRESSURE: 85 MMHG | SYSTOLIC BLOOD PRESSURE: 143 MMHG | WEIGHT: 244 LBS

## 2021-06-01 DIAGNOSIS — I25.10 ATHEROSCLEROTIC HEART DISEASE OF NATIVE CORONARY ARTERY WITHOUT ANGINA PECTORIS: ICD-10-CM

## 2021-06-01 DIAGNOSIS — Z95.2 PRESENCE OF PROSTHETIC HEART VALVE: Chronic | ICD-10-CM

## 2021-06-01 DIAGNOSIS — Z98.89 OTHER SPECIFIED POSTPROCEDURAL STATES: Chronic | ICD-10-CM

## 2021-06-01 DIAGNOSIS — Z86.018 PERSONAL HISTORY OF OTHER BENIGN NEOPLASM: Chronic | ICD-10-CM

## 2021-06-01 PROCEDURE — G0463: CPT

## 2021-06-08 DIAGNOSIS — I10 ESSENTIAL (PRIMARY) HYPERTENSION: ICD-10-CM

## 2021-06-08 DIAGNOSIS — I48.91 UNSPECIFIED ATRIAL FIBRILLATION: ICD-10-CM

## 2021-06-11 NOTE — HISTORY OF PRESENT ILLNESS
[FreeTextEntry1] : Mr. Zelaya is a 51 yo M with a PMH significant for severe MR and AI s/p bioprosthetic AVR, MVR in 9/2015, atrial flutter s/p ablation(1/2018), IDDM, HLD, HCV, pheochromocytoma s/p adrenalectomy who presents for follow up.\par \par Patient has been feeling well and has no complaints. He has been walking 30 minutes daily and has felt well.\par \par INR 2.7\par 143/85\par \par From prior notes: "He was discharged from [facility] in 9/2015 after a prolonged hospitalization secondary to PNA. His stay was complicated by septic and cardiogenic shock with incidental discovery of pheochromocytoma, severe mitral regurgitation and aortic regurgitation and hemodynamic instability secondary to AF with RVR. He was cardioverted and taken for subsequent pheochromocytoma removal as well as AVR/MVR. He is status post pheo removal as well as MVR/AVR. In 1/2016, he had atrial flutter and is now s/p aflutter ablation. He has been doing well post ablation. "\par \par Also Holter monitor in 2019 with brief episodes of AFib.

## 2021-06-11 NOTE — PHYSICAL EXAM
[No Acute Distress] : no acute distress [General Appearance - Well Developed] : well developed [Normal Appearance] : normal appearance [Well Groomed] : well groomed [General Appearance - Well Nourished] : well nourished [No Deformities] : no deformities [General Appearance - In No Acute Distress] : no acute distress [Normal Conjunctiva] : the conjunctiva exhibited no abnormalities [Eyelids - No Xanthelasma] : the eyelids demonstrated no xanthelasmas [Normal Oral Mucosa] : normal oral mucosa [No Oral Pallor] : no oral pallor [No Oral Cyanosis] : no oral cyanosis [Respiration, Rhythm And Depth] : normal respiratory rhythm and effort [Exaggerated Use Of Accessory Muscles For Inspiration] : no accessory muscle use [Auscultation Breath Sounds / Voice Sounds] : lungs were clear to auscultation bilaterally [Heart Rate And Rhythm] : heart rate and rhythm were normal [Heart Sounds] : normal S1 and S2 [Murmurs] : no murmurs present [Abdomen Soft] : soft [Abdomen Tenderness] : non-tender [Abdomen Mass (___ Cm)] : no abdominal mass palpated [Abnormal Walk] : normal gait [Gait - Sufficient For Exercise Testing] : the gait was sufficient for exercise testing [Nail Clubbing] : no clubbing of the fingernails [Cyanosis, Localized] : no localized cyanosis [Petechial Hemorrhages (___cm)] : no petechial hemorrhages [Skin Color & Pigmentation] : normal skin color and pigmentation [] : no rash [No Venous Stasis] : no venous stasis [Skin Lesions] : no skin lesions [No Skin Ulcers] : no skin ulcer [No Xanthoma] : no  xanthoma was observed [Oriented To Time, Place, And Person] : oriented to person, place, and time [Mood] : the mood was normal [Affect] : the affect was normal [No Anxiety] : not feeling anxious [FreeTextEntry1] : 3/6 systolic murmur heard best at the RUSB, 3/6 systolic murmur at the apex

## 2021-09-14 ENCOUNTER — OUTPATIENT (OUTPATIENT)
Dept: OUTPATIENT SERVICES | Facility: HOSPITAL | Age: 51
LOS: 1 days | End: 2021-09-14
Payer: MEDICAID

## 2021-09-14 ENCOUNTER — APPOINTMENT (OUTPATIENT)
Dept: CARDIOLOGY | Facility: HOSPITAL | Age: 51
End: 2021-09-14

## 2021-09-14 VITALS
SYSTOLIC BLOOD PRESSURE: 143 MMHG | DIASTOLIC BLOOD PRESSURE: 86 MMHG | HEIGHT: 65 IN | OXYGEN SATURATION: 97 % | HEART RATE: 74 BPM | WEIGHT: 241 LBS | BODY MASS INDEX: 40.15 KG/M2

## 2021-09-14 DIAGNOSIS — Z98.89 OTHER SPECIFIED POSTPROCEDURAL STATES: Chronic | ICD-10-CM

## 2021-09-14 DIAGNOSIS — I25.10 ATHEROSCLEROTIC HEART DISEASE OF NATIVE CORONARY ARTERY WITHOUT ANGINA PECTORIS: ICD-10-CM

## 2021-09-14 DIAGNOSIS — Z86.018 PERSONAL HISTORY OF OTHER BENIGN NEOPLASM: Chronic | ICD-10-CM

## 2021-09-14 DIAGNOSIS — Z95.2 PRESENCE OF PROSTHETIC HEART VALVE: Chronic | ICD-10-CM

## 2021-09-14 PROCEDURE — 93005 ELECTROCARDIOGRAM TRACING: CPT

## 2021-09-14 PROCEDURE — G0463: CPT

## 2021-09-15 DIAGNOSIS — Z95.3 PRESENCE OF XENOGENIC HEART VALVE: ICD-10-CM

## 2021-09-15 DIAGNOSIS — I10 ESSENTIAL (PRIMARY) HYPERTENSION: ICD-10-CM

## 2021-09-15 DIAGNOSIS — I48.91 UNSPECIFIED ATRIAL FIBRILLATION: ICD-10-CM

## 2021-09-15 NOTE — HISTORY OF PRESENT ILLNESS
[FreeTextEntry1] : Mr. Zelaya is a 52 yo M with a PMH significant for severe MR and AI s/p bioprosthetic AVR, MVR in 9/2015, atrial flutter s/p ablation(1/2018), IDDM, HLD, HCV, pheochromocytoma s/p adrenalectomy who presents for follow up.\par \par Patient has been feeling well and has no complaints. He has been walking 30 minutes daily and has felt well. Pt states some days he misses his Coumadin doses but infrequently \par \par INR 1.5 9/7, PREVIOUS INR  1.3, 1.3, 1.1, (June 2021) \par Outpatient labs reviewed 7/6/21: A1c 7.6\par Choles total 155, HDL 35, LDL 92\par \par From prior notes: "He was discharged from [facility] in 9/2015 after a prolonged hospitalization secondary to PNA. His stay was complicated by septic and cardiogenic shock with incidental discovery of pheochromocytoma, severe mitral regurgitation and aortic regurgitation and hemodynamic instability secondary to AF with RVR. He was cardioverted and taken for subsequent pheochromocytoma removal as well as AVR/MVR. He is status post pheo removal as well as MVR/AVR. In 1/2016, he had atrial flutter and is now s/p aflutter ablation. He has been doing well post ablation. "\par \par Also Holter monitor in 2019 with brief episodes of AFib.

## 2021-09-15 NOTE — REASON FOR VISIT
[Follow-Up - Clinic] : a clinic follow-up of [Symptom and Test Evaluation] : symptom and test evaluation [Structural Heart and Valve Disease] : structural heart and valve disease [FreeTextEntry1] : bioAVR, bioMVR

## 2021-09-15 NOTE — PHYSICAL EXAM
[No Acute Distress] : no acute distress [General Appearance - Well Developed] : well developed [Normal Appearance] : normal appearance [Well Groomed] : well groomed [General Appearance - Well Nourished] : well nourished [No Deformities] : no deformities [General Appearance - In No Acute Distress] : no acute distress [Eyelids - No Xanthelasma] : the eyelids demonstrated no xanthelasmas [Normal Oral Mucosa] : normal oral mucosa [No Oral Pallor] : no oral pallor [No Oral Cyanosis] : no oral cyanosis [Respiration, Rhythm And Depth] : normal respiratory rhythm and effort [Exaggerated Use Of Accessory Muscles For Inspiration] : no accessory muscle use [Auscultation Breath Sounds / Voice Sounds] : lungs were clear to auscultation bilaterally [Heart Rate And Rhythm] : heart rate and rhythm were normal [Heart Sounds] : normal S1 and S2 [Murmurs] : no murmurs present [Abdomen Soft] : soft [Abdomen Tenderness] : non-tender [Abdomen Mass (___ Cm)] : no abdominal mass palpated [Abnormal Walk] : normal gait [Gait - Sufficient For Exercise Testing] : the gait was sufficient for exercise testing [Nail Clubbing] : no clubbing of the fingernails [Cyanosis, Localized] : no localized cyanosis [Petechial Hemorrhages (___cm)] : no petechial hemorrhages [Skin Color & Pigmentation] : normal skin color and pigmentation [] : no rash [No Venous Stasis] : no venous stasis [Skin Lesions] : no skin lesions [No Skin Ulcers] : no skin ulcer [No Xanthoma] : no  xanthoma was observed [Oriented To Time, Place, And Person] : oriented to person, place, and time [Affect] : the affect was normal [Mood] : the mood was normal [No Anxiety] : not feeling anxious [Well Developed] : well developed [Well Nourished] : well nourished [Normal Conjunctiva] : normal conjunctiva [Normal Venous Pressure] : normal venous pressure [Normal S1, S2] : normal S1, S2 [Clear Lung Fields] : clear lung fields [Good Air Entry] : good air entry [Soft] : abdomen soft [Non Tender] : non-tender [Normal Gait] : normal gait [No Edema] : no edema [No Rash] : no rash [Moves all extremities] : moves all extremities [Alert and Oriented] : alert and oriented [de-identified] : Systolic murmur consistent w valve replacement  [FreeTextEntry1] : 3/6 systolic murmur heard best at the RUSB, 3/6 systolic murmur at the apex

## 2022-01-11 ENCOUNTER — OUTPATIENT (OUTPATIENT)
Dept: OUTPATIENT SERVICES | Facility: HOSPITAL | Age: 52
LOS: 1 days | End: 2022-01-11
Payer: MEDICAID

## 2022-01-11 ENCOUNTER — APPOINTMENT (OUTPATIENT)
Dept: CARDIOLOGY | Facility: HOSPITAL | Age: 52
End: 2022-01-11

## 2022-01-11 ENCOUNTER — NON-APPOINTMENT (OUTPATIENT)
Age: 52
End: 2022-01-11

## 2022-01-11 VITALS — SYSTOLIC BLOOD PRESSURE: 127 MMHG | HEART RATE: 62 BPM | DIASTOLIC BLOOD PRESSURE: 80 MMHG

## 2022-01-11 DIAGNOSIS — Z98.89 OTHER SPECIFIED POSTPROCEDURAL STATES: Chronic | ICD-10-CM

## 2022-01-11 DIAGNOSIS — Z95.2 PRESENCE OF PROSTHETIC HEART VALVE: Chronic | ICD-10-CM

## 2022-01-11 DIAGNOSIS — Z86.018 PERSONAL HISTORY OF OTHER BENIGN NEOPLASM: Chronic | ICD-10-CM

## 2022-01-11 DIAGNOSIS — I25.10 ATHEROSCLEROTIC HEART DISEASE OF NATIVE CORONARY ARTERY WITHOUT ANGINA PECTORIS: ICD-10-CM

## 2022-01-11 PROCEDURE — 93005 ELECTROCARDIOGRAM TRACING: CPT

## 2022-01-11 PROCEDURE — G0463: CPT

## 2022-01-11 NOTE — REVIEW OF SYSTEMS
[Fever] : no fever [Chills] : no chills [Blurry Vision] : no blurred vision [SOB] : no shortness of breath [Dyspnea on exertion] : not dyspnea during exertion [Chest Discomfort] : no chest discomfort [Syncope] : no syncope [Cough] : no cough [Abdominal Pain] : no abdominal pain [Joint Pain] : no joint pain [Rash] : no rash Unable to consent due to medical condition

## 2022-01-11 NOTE — PHYSICAL EXAM
[Well Developed] : well developed [Well Nourished] : well nourished [Normal Venous Pressure] : normal venous pressure [No Carotid Bruit] : no carotid bruit [Normal S1, S2] : normal S1, S2 [Clear Lung Fields] : clear lung fields [Soft] : abdomen soft [Normal Gait] : normal gait [No Edema] : no edema [No Rash] : no rash [No Focal Deficits] : no focal deficits [Alert and Oriented] : alert and oriented [de-identified] : 3/6 murmur appreciated

## 2022-01-11 NOTE — REASON FOR VISIT
[Structural Heart and Valve Disease] : structural heart and valve disease [FreeTextEntry1] : 50 yo M with a PMH significant for severe MR and AI s/p bioprosthetic AVR, MVR in 9/2015, atrial flutter s/p ablation(1/2018), IDDM, HLD, HCV, pheochromocytoma s/p adrenalectomy who presents for follow up.\par \par Feels well today denies chest pain or shortness of breath. Works as a  and doesn't have much time for exercise (walks 30 minutes a day without limitations). Sometimes misses Coumadin doses.\par \par A1c is increasing based on outpatient labs 8.4 10/20/21. Seen by Endocrinology 3 months ago, next appt this month. Takes Metformin, Pioglitazone, Januvia, Glimepiride and Lantus. \par \par Sexually active - recently prescribed Tadalafil. \par INR 2.1 (1/5/21) 1.5 (12/29), 1.7 (12/22)\par \par Choles total 131, HDL 27, LDL 76\par sCR 1.14\par Hb 14.4/43.7\par \par

## 2022-01-11 NOTE — CARDIOLOGY SUMMARY
[de-identified] : 1/11/21: NSR with T wave flattening laterally\par 8/7/2018, NSR, lateral TWIs, poor R wave progression  [de-identified] : 7/6/2016, 1. Bioprosthetic mitral valve replacement. Peak mitralvalve gradient equals 12 mm Hg, mean transmitral valvegradient equals 6 mm Hg, which is mildly elevated even in the setting of a bioprosthetic mitral valve replacement.2. Bioprosthetic valve. Peak transaortic valve gradientequals 25 mm Hg, mean transaortic valve gradient equals 14mm Hg, which is probably normal in the presence of abioprosthetic valve.3. Increased relative wall thickness with normal leftventricular mass index, consistent with concentric leftventricular remodeling.4. Normal left ventricular systolic function. No segmentalwall motion abnormalities.5. Mild diastolic dysfunction (Stage I).6. Normal right ventricular size and function. LVEF 67%.  [de-identified] : 9/11/2015, VENTRICLES: No left ventriculogram was performed.CORONARY VESSELS: The coronary circulation is right dominant.LM: -- LM: Normal.LAD: -- LAD: Normal.CX: -- Circumflex: Angiography showed minor luminal irregularities withno flow limiting lesions.RCA: -- RCA: Angiography showed mild atherosclerosis with no flowlimiting lesions.

## 2022-01-12 DIAGNOSIS — I48.91 UNSPECIFIED ATRIAL FIBRILLATION: ICD-10-CM

## 2022-01-12 DIAGNOSIS — I10 ESSENTIAL (PRIMARY) HYPERTENSION: ICD-10-CM

## 2022-01-12 DIAGNOSIS — Z95.3 PRESENCE OF XENOGENIC HEART VALVE: ICD-10-CM

## 2022-07-12 ENCOUNTER — NON-APPOINTMENT (OUTPATIENT)
Age: 52
End: 2022-07-12

## 2022-07-12 ENCOUNTER — OUTPATIENT (OUTPATIENT)
Dept: OUTPATIENT SERVICES | Facility: HOSPITAL | Age: 52
LOS: 1 days | End: 2022-07-12
Payer: MEDICAID

## 2022-07-12 ENCOUNTER — APPOINTMENT (OUTPATIENT)
Dept: CARDIOLOGY | Facility: HOSPITAL | Age: 52
End: 2022-07-12

## 2022-07-12 VITALS
OXYGEN SATURATION: 99 % | HEIGHT: 65 IN | WEIGHT: 236.11 LBS | DIASTOLIC BLOOD PRESSURE: 87 MMHG | BODY MASS INDEX: 39.34 KG/M2 | HEART RATE: 69 BPM | SYSTOLIC BLOOD PRESSURE: 144 MMHG

## 2022-07-12 DIAGNOSIS — Z95.2 PRESENCE OF PROSTHETIC HEART VALVE: Chronic | ICD-10-CM

## 2022-07-12 DIAGNOSIS — Z86.018 PERSONAL HISTORY OF OTHER BENIGN NEOPLASM: Chronic | ICD-10-CM

## 2022-07-12 DIAGNOSIS — I25.10 ATHEROSCLEROTIC HEART DISEASE OF NATIVE CORONARY ARTERY WITHOUT ANGINA PECTORIS: ICD-10-CM

## 2022-07-12 DIAGNOSIS — R06.02 SHORTNESS OF BREATH: ICD-10-CM

## 2022-07-12 DIAGNOSIS — Z98.89 OTHER SPECIFIED POSTPROCEDURAL STATES: Chronic | ICD-10-CM

## 2022-07-12 PROCEDURE — 93005 ELECTROCARDIOGRAM TRACING: CPT

## 2022-07-12 PROCEDURE — G0463: CPT

## 2022-07-13 DIAGNOSIS — I48.91 UNSPECIFIED ATRIAL FIBRILLATION: ICD-10-CM

## 2022-07-13 DIAGNOSIS — I10 ESSENTIAL (PRIMARY) HYPERTENSION: ICD-10-CM

## 2022-07-13 DIAGNOSIS — Z95.3 PRESENCE OF XENOGENIC HEART VALVE: ICD-10-CM

## 2022-07-13 DIAGNOSIS — R06.02 SHORTNESS OF BREATH: ICD-10-CM

## 2022-07-15 NOTE — REASON FOR VISIT
[Symptom and Test Evaluation] : symptom and test evaluation [FreeTextEntry1] : 50 yo M with a PMH significant for severe MR and AI s/p bioprosthetic AVR, MVR in 9/2015, atrial flutter s/p ablation(1/2018), IDDM, HLD, HCV, pheochromocytoma s/p adrenalectomy who presents for follow up.\par \par Complaints of SOB/Chest pressure intermittently. Works as a  and doesn't have much time for exercise (walks 30 minutes a day without limitations). Sometimes misses Coumadin doses.INR log reviewed 1.2 to 2.1.\par \par A1c is increasing based on outpatient labs 8.04/19/22. Seen by Endocrinology. Takes Metformin, Pioglitazone, Januvia, Glimepiride and Lantus. \par \par Labs April 2022\par Choles total 81, HDL 31, LDL 36\par sCR 1.03\par Hb 13.2\par

## 2022-09-21 ENCOUNTER — APPOINTMENT (OUTPATIENT)
Dept: CV DIAGNOSITCS | Facility: HOSPITAL | Age: 52
End: 2022-09-21

## 2022-09-21 ENCOUNTER — OUTPATIENT (OUTPATIENT)
Dept: OUTPATIENT SERVICES | Facility: HOSPITAL | Age: 52
LOS: 1 days | End: 2022-09-21
Payer: MEDICAID

## 2022-09-21 DIAGNOSIS — I10 ESSENTIAL (PRIMARY) HYPERTENSION: ICD-10-CM

## 2022-09-21 DIAGNOSIS — Z95.3 PRESENCE OF XENOGENIC HEART VALVE: ICD-10-CM

## 2022-09-21 DIAGNOSIS — Z86.018 PERSONAL HISTORY OF OTHER BENIGN NEOPLASM: Chronic | ICD-10-CM

## 2022-09-21 DIAGNOSIS — Z95.2 PRESENCE OF PROSTHETIC HEART VALVE: Chronic | ICD-10-CM

## 2022-09-21 DIAGNOSIS — Z98.89 OTHER SPECIFIED POSTPROCEDURAL STATES: Chronic | ICD-10-CM

## 2022-09-21 DIAGNOSIS — R06.02 SHORTNESS OF BREATH: ICD-10-CM

## 2022-09-21 DIAGNOSIS — I48.91 UNSPECIFIED ATRIAL FIBRILLATION: ICD-10-CM

## 2022-09-21 PROCEDURE — 93320 DOPPLER ECHO COMPLETE: CPT | Mod: 26

## 2022-09-21 PROCEDURE — 93325 DOPPLER ECHO COLOR FLOW MAPG: CPT

## 2022-09-21 PROCEDURE — 93325 DOPPLER ECHO COLOR FLOW MAPG: CPT | Mod: 26

## 2022-09-21 PROCEDURE — 93320 DOPPLER ECHO COMPLETE: CPT

## 2022-09-21 PROCEDURE — C8930: CPT

## 2022-09-21 PROCEDURE — 93350 STRESS TTE ONLY: CPT | Mod: 26

## 2022-10-25 ENCOUNTER — APPOINTMENT (OUTPATIENT)
Dept: CARDIOLOGY | Facility: HOSPITAL | Age: 52
End: 2022-10-25

## 2022-10-25 ENCOUNTER — OUTPATIENT (OUTPATIENT)
Dept: OUTPATIENT SERVICES | Facility: HOSPITAL | Age: 52
LOS: 1 days | End: 2022-10-25
Payer: MEDICAID

## 2022-10-25 VITALS
DIASTOLIC BLOOD PRESSURE: 84 MMHG | WEIGHT: 236 LBS | HEIGHT: 65 IN | SYSTOLIC BLOOD PRESSURE: 155 MMHG | OXYGEN SATURATION: 98 % | BODY MASS INDEX: 39.32 KG/M2 | HEART RATE: 59 BPM

## 2022-10-25 DIAGNOSIS — Z86.018 PERSONAL HISTORY OF OTHER BENIGN NEOPLASM: Chronic | ICD-10-CM

## 2022-10-25 DIAGNOSIS — I25.10 ATHEROSCLEROTIC HEART DISEASE OF NATIVE CORONARY ARTERY WITHOUT ANGINA PECTORIS: ICD-10-CM

## 2022-10-25 DIAGNOSIS — I10 ESSENTIAL (PRIMARY) HYPERTENSION: ICD-10-CM

## 2022-10-25 DIAGNOSIS — Z95.2 PRESENCE OF PROSTHETIC HEART VALVE: Chronic | ICD-10-CM

## 2022-10-25 DIAGNOSIS — Z98.89 OTHER SPECIFIED POSTPROCEDURAL STATES: Chronic | ICD-10-CM

## 2022-10-25 PROCEDURE — G0463: CPT

## 2022-10-25 PROCEDURE — 93005 ELECTROCARDIOGRAM TRACING: CPT

## 2022-10-26 PROBLEM — I10 HYPERTENSION: Status: ACTIVE | Noted: 2022-10-26

## 2022-10-27 DIAGNOSIS — I10 ESSENTIAL (PRIMARY) HYPERTENSION: ICD-10-CM

## 2022-10-27 NOTE — HISTORY OF PRESENT ILLNESS
[FreeTextEntry1] : 53yo M with a PMH significant for severe MR and AI s/p bioprosthetic AVR, MVR in 9/2015, atrial flutter s/p ablation(1/2018), IDDM, HLD, HCV, pheochromocytoma s/p adrenalectomy who presents for follow up.\par \par Complaints of SOB/Chest pressure intermittently and completed treadmill stress echo 9/22 (6 METS, Normal hemodynamic, electrocardiographic and augmentation of LVSF. His transmitral gradient during exercise increased from 6 mmHg @67 bpm to 12.2 mmHg @97 bpm). \par \par Currently denies cardiac symptoms. No dyspnea on exertion does not exercise much.

## 2022-10-27 NOTE — ASSESSMENT
[FreeTextEntry1] : 51yo M with a PMH significant for severe MR and AI s/p bioprosthetic AVR, MVR in 9/2015, atrial flutter s/p ablation(1/2018), IDDM, HLD, HCV, pheochromocytoma s/p adrenalectomy who presents for follow up.\par \par Elevated gradients across the mitral valve bioprosthesis with tachycardia and hypertension which may explain SOB. Currently denies cardiac symptoms or dyspnea. \par Today remains HTN to 155 systolic, will add chlorthalidone 25 mg to current regiment \par Cont Aspirin for bioprosthetic MVR and Coumadin for hx of aflutter \par Cont Metoprolol at current dose- consider uptitrating if shortness of breath persists after control of HTN

## 2023-01-31 ENCOUNTER — APPOINTMENT (OUTPATIENT)
Dept: CARDIOLOGY | Facility: HOSPITAL | Age: 53
End: 2023-01-31

## 2023-01-31 ENCOUNTER — OUTPATIENT (OUTPATIENT)
Dept: OUTPATIENT SERVICES | Facility: HOSPITAL | Age: 53
LOS: 1 days | End: 2023-01-31
Payer: MEDICAID

## 2023-01-31 ENCOUNTER — NON-APPOINTMENT (OUTPATIENT)
Age: 53
End: 2023-01-31

## 2023-01-31 VITALS
WEIGHT: 233 LBS | BODY MASS INDEX: 38.82 KG/M2 | HEART RATE: 65 BPM | DIASTOLIC BLOOD PRESSURE: 65 MMHG | OXYGEN SATURATION: 98 % | SYSTOLIC BLOOD PRESSURE: 103 MMHG | HEIGHT: 65 IN

## 2023-01-31 DIAGNOSIS — R06.00 DYSPNEA, UNSPECIFIED: ICD-10-CM

## 2023-01-31 DIAGNOSIS — I25.10 ATHEROSCLEROTIC HEART DISEASE OF NATIVE CORONARY ARTERY WITHOUT ANGINA PECTORIS: ICD-10-CM

## 2023-01-31 PROCEDURE — 93005 ELECTROCARDIOGRAM TRACING: CPT

## 2023-01-31 PROCEDURE — G0463: CPT

## 2023-01-31 RX ORDER — PANTOPRAZOLE SODIUM 40 MG/1
40 TABLET, DELAYED RELEASE ORAL
Refills: 0 | Status: ACTIVE | COMMUNITY

## 2023-01-31 NOTE — REASON FOR VISIT
[Follow-Up - Clinic] : a clinic follow-up of [Atrial Fibrillation] : atrial fibrillation [Prosthetic Valve] : a prosthetic valve [FreeTextEntry1] : severe MR and AI s/p bioprosthetic AVR, MVR in 9/2017, atrial flutter s/p ablation(1/2018)  [Symptom and Test Evaluation] : symptom and test evaluation

## 2023-01-31 NOTE — ASSESSMENT
[FreeTextEntry1] : 53yo M with a PMH significant for severe MR and AI s/p bioprosthetic AVR, MVR in 9/2015, atrial flutter s/p ablation(1/2018), IDDM, HLD, HCV, pheochromocytoma s/p adrenalectomy who presents for follow up.\par \par He is stable from cardiac perspective. He is starting to exercise more. Dyspnea has resolved. Endorses ED and is seeing a urologist.\par \par Cont current cardiac meds \par HTN- Chlorthalidone and Enalipril\par Cont Aspirin for bioprosthetic MVR and Coumadin for hx of aflutter \par Cont Metoprolol at current dose \par \par RTC 3 months

## 2023-01-31 NOTE — REASON FOR VISIT
[Structural Heart and Valve Disease] : structural heart and valve disease [FreeTextEntry1] : 53yo M with a PMH significant for severe MR and AI s/p bioprosthetic AVR, MVR in 9/2015, atrial flutter s/p ablation(1/2018), IDDM, HLD, HCV, pheochromocytoma s/p adrenalectomy who presents for follow up.\par \par Complaints of SOB/Chest pressure intermittently and completed treadmill stress echo 9/22 (6 METS, Normal hemodynamic, electrocardiographic and augmentation of LVSF. His transmitral gradient during exercise increased from 6 mmHg @67 bpm to 12.2 mmHg @97 bpm). His medications were adjusted at that time now with better Blood pressure control. \par \par Currently denies cardiac symptoms.  He is starting to exercise more but is endorsing symptoms of erectile dysfunction and has recently seen a urologist. \par \par Blood from outside facility reviewed 1/13/23\par A1c 7.5%\par Total chol 212, Triglyceride 183, HDL 34, VLDL 33,  (All values improved from 10/12/22)\par sCR 0.96 -> 1.25\par INR trend 2.5 -> 2.6 -> 3.1 -> 2.4 -> 1.9

## 2023-02-01 DIAGNOSIS — R06.00 DYSPNEA, UNSPECIFIED: ICD-10-CM

## 2023-02-01 DIAGNOSIS — I35.1 NONRHEUMATIC AORTIC (VALVE) INSUFFICIENCY: ICD-10-CM

## 2023-03-19 NOTE — HISTORY OF PRESENT ILLNESS
COPD EDUCATION by COPD CLINICAL EDUCATOR  (Phone: [phone])  3/19/2023 at 1:36 PM by Maryjo Whyte RRT     Patient was interviewed by Respiratory Education team. Patient unable to engage in meaningful conversation. She has Asthma  and I a non smoker. She is unable to answer questions and participate. Discussed with RN and plan is for discharge to care facility    COPD-Lung  Screen  COPD Risk Screening  Do you have a history of COPD?: Yes (no pft.  quit smoking 38 yrs ago. no maintenance inhaler.)  Do you have a Pulmonologist?: No  COPD Population Screener  During the past 4 weeks, how much did you feel short of breath?: None/Little of the time  Do you ever cough up any mucus or phlegm?: No/only with occasional colds or infections  In the past 12 months, you do less than you used to because of your breathing problems: Disagree/unsure  Have you smoked at least 100 cigarettes in your entire life?: Yes  How old are you?: 60+  COPD Screening Score: 4  COPD Coordinator Not Recommended: Yes    COPD Assessment  COPD Clinical Specialists ONLY  COPD Education Initiated: Yes--Short Intervention (attempted discussion at bedside-D/W RN conversation she is unable to engage SNF plan and pt unable to care for self. Our team has attempted befor and prior had Asthma and denial of COPD nonsmoker left AMA then  Dulera, Albuterol MDI/nebulizer)    PFT Results  No results found for: PFT    Meds to Beds  Would the patient like to opt in for Bedside Medication Delivery at Discharge?: Yes, interested      
[FreeTextEntry1] : Mr. Zelaya is a 49 yo M with a PMH significant for severe MR and AI s/p bioprosthetic AVR, MVR in 9/2015, atrial flutter s/p ablation(1/2018), IDDM, HLD, HCV, pheochromocytoma s/p adrenalectomy who presents for follow up.\par \par Patient has been feeling well since his last visit. He went back to work about 3 weeks ago and has felt well. He denies chest pain, SOB, MORENO, orthopnea. He is no longer exercising because he is now working but is eager to start biking and running.\par \par Recent labs:\par A1C 7.3 \par \par \par \par HDL 30\par \par Cr 1.13\par INR 1.5-3.4, mostly in the 2s\par \par \par From prior notes: "He was discharged from [facility] in 9/2015 after a prolonged hospitalization secondary to PNA. His stay was complicated by septic and cardiogenic shock with incidental discovery of pheochromocytoma, severe mitral regurgitation and aortic regurgitation and hemodynamic instability secondary to AF with RVR. He was cardioverted and taken for subsequent pheochromocytoma removal as well as AVR/MVR. He is status post pheo removal as well as MVR/AVR. In 1/2016, he had atrial flutter and is now s/p aflutter ablation. He has been doing well post ablation. "\par \par Also Holter monitor in 2019 with brief episodes of AFib.

## 2023-05-01 ENCOUNTER — RX RENEWAL (OUTPATIENT)
Age: 53
End: 2023-05-01

## 2023-05-02 ENCOUNTER — OUTPATIENT (OUTPATIENT)
Dept: OUTPATIENT SERVICES | Facility: HOSPITAL | Age: 53
LOS: 1 days | End: 2023-05-02
Payer: MEDICAID

## 2023-05-02 ENCOUNTER — APPOINTMENT (OUTPATIENT)
Dept: CARDIOLOGY | Facility: HOSPITAL | Age: 53
End: 2023-05-02

## 2023-05-02 VITALS
DIASTOLIC BLOOD PRESSURE: 81 MMHG | OXYGEN SATURATION: 98 % | HEIGHT: 65 IN | HEART RATE: 110 BPM | WEIGHT: 233 LBS | BODY MASS INDEX: 38.82 KG/M2 | SYSTOLIC BLOOD PRESSURE: 114 MMHG

## 2023-05-02 DIAGNOSIS — Z98.89 OTHER SPECIFIED POSTPROCEDURAL STATES: Chronic | ICD-10-CM

## 2023-05-02 DIAGNOSIS — Z86.018 PERSONAL HISTORY OF OTHER BENIGN NEOPLASM: Chronic | ICD-10-CM

## 2023-05-02 DIAGNOSIS — I35.1 NONRHEUMATIC AORTIC (VALVE) INSUFFICIENCY: ICD-10-CM

## 2023-05-02 DIAGNOSIS — I25.10 ATHEROSCLEROTIC HEART DISEASE OF NATIVE CORONARY ARTERY WITHOUT ANGINA PECTORIS: ICD-10-CM

## 2023-05-02 DIAGNOSIS — Z95.2 PRESENCE OF PROSTHETIC HEART VALVE: Chronic | ICD-10-CM

## 2023-05-02 PROCEDURE — G0463: CPT

## 2023-05-02 PROCEDURE — 93005 ELECTROCARDIOGRAM TRACING: CPT

## 2023-05-03 DIAGNOSIS — I35.1 NONRHEUMATIC AORTIC (VALVE) INSUFFICIENCY: ICD-10-CM

## 2023-05-03 NOTE — ASSESSMENT
[FreeTextEntry1] : 53yo M with a PMH significant for severe MR and AI s/p bioprosthetic AVR, MVR in 9/2015, atrial flutter s/p ablation(1/2018), IDDM, HLD, HCV, pheochromocytoma s/p adrenalectomy who presents for follow up.\par \par He stable from cardiac perspective however needs weight loss, better DM control and lipid management.\par \par Will cont Lipitor and start Zetia today.\par \par Cont current cardiac meds \par HTN- Chlorthalidone and Enalipril\par Cont Aspirin for bioprosthetic MVR and Coumadin for hx of aflutter \par Cont Metoprolol at current dose \par \par RTC 3 months.

## 2023-05-03 NOTE — HISTORY OF PRESENT ILLNESS
[FreeTextEntry1] : 51yo M with a PMH significant for severe MR and AI s/p bioprosthetic AVR, MVR in 9/2015, atrial flutter s/p ablation(1/2018), IDDM, HLD, HCV, pheochromocytoma s/p adrenalectomy who presents for follow up.\par \par Cont to gain weight, endorses poor diet and lack or exercise given he is a . \par \par Blood from outside facility reviewed 1/13/23\par A1c 7.5%\par Total chol 212, Triglyceride 183, HDL 34, VLDL 33,  (All values improved from 10/12/22)\par sCR 0.96 -> 1.25\par INR trend 2.5 -> 2.6 -> 3.1 -> 2.4 -> 1.9

## 2023-08-15 ENCOUNTER — APPOINTMENT (OUTPATIENT)
Dept: CARDIOLOGY | Facility: HOSPITAL | Age: 53
End: 2023-08-15

## 2023-08-15 ENCOUNTER — OUTPATIENT (OUTPATIENT)
Dept: OUTPATIENT SERVICES | Facility: HOSPITAL | Age: 53
LOS: 1 days | End: 2023-08-15
Payer: MEDICAID

## 2023-08-15 VITALS
HEART RATE: 125 BPM | BODY MASS INDEX: 38.82 KG/M2 | DIASTOLIC BLOOD PRESSURE: 84 MMHG | HEIGHT: 65 IN | OXYGEN SATURATION: 99 % | SYSTOLIC BLOOD PRESSURE: 130 MMHG | WEIGHT: 233 LBS

## 2023-08-15 DIAGNOSIS — Z95.2 PRESENCE OF PROSTHETIC HEART VALVE: Chronic | ICD-10-CM

## 2023-08-15 DIAGNOSIS — Z86.018 PERSONAL HISTORY OF OTHER BENIGN NEOPLASM: Chronic | ICD-10-CM

## 2023-08-15 DIAGNOSIS — Z95.3 PRESENCE OF XENOGENIC HEART VALVE: ICD-10-CM

## 2023-08-15 DIAGNOSIS — Z98.89 OTHER SPECIFIED POSTPROCEDURAL STATES: Chronic | ICD-10-CM

## 2023-08-15 DIAGNOSIS — I25.10 ATHEROSCLEROTIC HEART DISEASE OF NATIVE CORONARY ARTERY WITHOUT ANGINA PECTORIS: ICD-10-CM

## 2023-08-15 PROCEDURE — G0463: CPT

## 2023-08-15 PROCEDURE — 93005 ELECTROCARDIOGRAM TRACING: CPT

## 2023-08-15 RX ORDER — ASPIRIN ENTERIC COATED TABLETS 81 MG 81 MG/1
81 TABLET, DELAYED RELEASE ORAL DAILY
Qty: 30 | Refills: 3 | Status: ACTIVE | COMMUNITY
Start: 2019-02-12 | End: 1900-01-01

## 2023-08-15 NOTE — PHYSICAL EXAM

## 2023-08-15 NOTE — ASSESSMENT
[FreeTextEntry1] : 51yo M with a PMH significant for severe MR and AI s/p bioprosthetic AVR, MVR in 9/2015, atrial flutter s/p ablation (1/2018), IDDM, HLD, HCV, pheochromocytoma s/p adrenalectomy who presents for follow up.  #HLD - Last LDL 58 in 7/2023 - Cont home lipitor and zetia   #HTN - Home -120s/80s-90s - Cont Chlorthalidone 25 Enalipril 5 and MTP Succ 100 QDay - Can consider inc enalapril on next visit if diastolics still high    #Hx of Bio MVR and AVR - Aspirin for bioprosthetic MVR - Cont Metoprolol Succ 100 QDay - Repeat TTE   #AFib/Flutter - Still in rate controlled AFib today - Cont home MTP Succ 100 QDay and Coumadin 7.5 QDay, Goal INR 2-3  #Overweight - Discussed healthy eating strategies with more fruits/vegetables/lean meats and less bread  RTC 3 mths per pt preference Meds refilled  Colton Salas PGY5 Cardiology Fellow  HUSSAIN Carranza

## 2023-08-15 NOTE — PHYSICAL EXAM

## 2023-08-16 DIAGNOSIS — Z95.3 PRESENCE OF XENOGENIC HEART VALVE: ICD-10-CM

## 2023-11-21 ENCOUNTER — NON-APPOINTMENT (OUTPATIENT)
Age: 53
End: 2023-11-21

## 2023-11-21 ENCOUNTER — APPOINTMENT (OUTPATIENT)
Dept: CARDIOLOGY | Facility: HOSPITAL | Age: 53
End: 2023-11-21

## 2023-11-21 ENCOUNTER — OUTPATIENT (OUTPATIENT)
Dept: OUTPATIENT SERVICES | Facility: HOSPITAL | Age: 53
LOS: 1 days | End: 2023-11-21
Payer: MEDICAID

## 2023-11-21 VITALS — HEART RATE: 124 BPM | DIASTOLIC BLOOD PRESSURE: 87 MMHG | OXYGEN SATURATION: 98 % | SYSTOLIC BLOOD PRESSURE: 114 MMHG

## 2023-11-21 DIAGNOSIS — Z95.2 PRESENCE OF PROSTHETIC HEART VALVE: Chronic | ICD-10-CM

## 2023-11-21 DIAGNOSIS — I25.10 ATHEROSCLEROTIC HEART DISEASE OF NATIVE CORONARY ARTERY WITHOUT ANGINA PECTORIS: ICD-10-CM

## 2023-11-21 DIAGNOSIS — Z98.89 OTHER SPECIFIED POSTPROCEDURAL STATES: Chronic | ICD-10-CM

## 2023-11-21 DIAGNOSIS — Z86.018 PERSONAL HISTORY OF OTHER BENIGN NEOPLASM: Chronic | ICD-10-CM

## 2023-11-21 PROCEDURE — G0463: CPT

## 2023-11-21 PROCEDURE — 93005 ELECTROCARDIOGRAM TRACING: CPT

## 2023-11-22 DIAGNOSIS — I48.91 UNSPECIFIED ATRIAL FIBRILLATION: ICD-10-CM

## 2024-02-27 ENCOUNTER — APPOINTMENT (OUTPATIENT)
Dept: CARDIOLOGY | Facility: HOSPITAL | Age: 54
End: 2024-02-27

## 2024-02-27 ENCOUNTER — INPATIENT (INPATIENT)
Facility: HOSPITAL | Age: 54
LOS: 4 days | Discharge: HOME | DRG: 274 | End: 2024-03-03
Attending: HOSPITALIST | Admitting: HOSPITALIST
Payer: MEDICAID

## 2024-02-27 VITALS
TEMPERATURE: 97 F | DIASTOLIC BLOOD PRESSURE: 80 MMHG | HEIGHT: 68 IN | SYSTOLIC BLOOD PRESSURE: 109 MMHG | OXYGEN SATURATION: 100 % | HEART RATE: 161 BPM | WEIGHT: 235.89 LBS | RESPIRATION RATE: 18 BRPM

## 2024-02-27 VITALS
SYSTOLIC BLOOD PRESSURE: 106 MMHG | OXYGEN SATURATION: 100 % | DIASTOLIC BLOOD PRESSURE: 72 MMHG | HEIGHT: 65 IN | BODY MASS INDEX: 39.32 KG/M2 | WEIGHT: 236 LBS | HEART RATE: 163 BPM

## 2024-02-27 DIAGNOSIS — Z86.018 PERSONAL HISTORY OF OTHER BENIGN NEOPLASM: Chronic | ICD-10-CM

## 2024-02-27 DIAGNOSIS — Z95.2 PRESENCE OF PROSTHETIC HEART VALVE: Chronic | ICD-10-CM

## 2024-02-27 DIAGNOSIS — I48.92 UNSPECIFIED ATRIAL FLUTTER: ICD-10-CM

## 2024-02-27 DIAGNOSIS — I50.23 ACUTE ON CHRONIC SYSTOLIC (CONGESTIVE) HEART FAILURE: ICD-10-CM

## 2024-02-27 DIAGNOSIS — Z98.89 OTHER SPECIFIED POSTPROCEDURAL STATES: Chronic | ICD-10-CM

## 2024-02-27 LAB
ALBUMIN SERPL ELPH-MCNC: 4.4 G/DL — SIGNIFICANT CHANGE UP (ref 3.3–5)
ALP SERPL-CCNC: 86 U/L — SIGNIFICANT CHANGE UP (ref 40–120)
ALT FLD-CCNC: 26 U/L — SIGNIFICANT CHANGE UP (ref 10–45)
ANION GAP SERPL CALC-SCNC: 10 MMOL/L — SIGNIFICANT CHANGE UP (ref 5–17)
APTT BLD: 33.6 SEC — SIGNIFICANT CHANGE UP (ref 24.5–35.6)
AST SERPL-CCNC: 35 U/L — SIGNIFICANT CHANGE UP (ref 10–40)
BASOPHILS # BLD AUTO: 0.1 K/UL — SIGNIFICANT CHANGE UP (ref 0–0.2)
BASOPHILS NFR BLD AUTO: 1 % — SIGNIFICANT CHANGE UP (ref 0–2)
BILIRUB SERPL-MCNC: 0.4 MG/DL — SIGNIFICANT CHANGE UP (ref 0.2–1.2)
BUN SERPL-MCNC: 38 MG/DL — HIGH (ref 7–23)
CALCIUM SERPL-MCNC: 9.9 MG/DL — SIGNIFICANT CHANGE UP (ref 8.4–10.5)
CHLORIDE SERPL-SCNC: 100 MMOL/L — SIGNIFICANT CHANGE UP (ref 96–108)
CO2 SERPL-SCNC: 23 MMOL/L — SIGNIFICANT CHANGE UP (ref 22–31)
CREAT SERPL-MCNC: 2.02 MG/DL — HIGH (ref 0.5–1.3)
EGFR: 39 ML/MIN/1.73M2 — LOW
EOSINOPHIL # BLD AUTO: 0.14 K/UL — SIGNIFICANT CHANGE UP (ref 0–0.5)
EOSINOPHIL NFR BLD AUTO: 1.4 % — SIGNIFICANT CHANGE UP (ref 0–6)
GLUCOSE SERPL-MCNC: 136 MG/DL — HIGH (ref 70–99)
HCT VFR BLD CALC: 40.7 % — SIGNIFICANT CHANGE UP (ref 39–50)
HGB BLD-MCNC: 12.7 G/DL — LOW (ref 13–17)
IMM GRANULOCYTES NFR BLD AUTO: 0.7 % — SIGNIFICANT CHANGE UP (ref 0–0.9)
INR BLD: 2.21 RATIO — HIGH (ref 0.85–1.18)
LYMPHOCYTES # BLD AUTO: 2.63 K/UL — SIGNIFICANT CHANGE UP (ref 1–3.3)
LYMPHOCYTES # BLD AUTO: 26.3 % — SIGNIFICANT CHANGE UP (ref 13–44)
MAGNESIUM SERPL-MCNC: 2.1 MG/DL — SIGNIFICANT CHANGE UP (ref 1.6–2.6)
MCHC RBC-ENTMCNC: 28.1 PG — SIGNIFICANT CHANGE UP (ref 27–34)
MCHC RBC-ENTMCNC: 31.2 GM/DL — LOW (ref 32–36)
MCV RBC AUTO: 90 FL — SIGNIFICANT CHANGE UP (ref 80–100)
MONOCYTES # BLD AUTO: 1.18 K/UL — HIGH (ref 0–0.9)
MONOCYTES NFR BLD AUTO: 11.8 % — SIGNIFICANT CHANGE UP (ref 2–14)
NEUTROPHILS # BLD AUTO: 5.87 K/UL — SIGNIFICANT CHANGE UP (ref 1.8–7.4)
NEUTROPHILS NFR BLD AUTO: 58.8 % — SIGNIFICANT CHANGE UP (ref 43–77)
NRBC # BLD: 0 /100 WBCS — SIGNIFICANT CHANGE UP (ref 0–0)
NT-PROBNP SERPL-SCNC: 4331 PG/ML — HIGH (ref 0–300)
PLATELET # BLD AUTO: 246 K/UL — SIGNIFICANT CHANGE UP (ref 150–400)
POTASSIUM SERPL-MCNC: 4.9 MMOL/L — SIGNIFICANT CHANGE UP (ref 3.5–5.3)
POTASSIUM SERPL-SCNC: 4.9 MMOL/L — SIGNIFICANT CHANGE UP (ref 3.5–5.3)
PROT SERPL-MCNC: 8.5 G/DL — HIGH (ref 6–8.3)
PROTHROM AB SERPL-ACNC: 23.8 SEC — HIGH (ref 9.5–13)
RBC # BLD: 4.52 M/UL — SIGNIFICANT CHANGE UP (ref 4.2–5.8)
RBC # FLD: 15.4 % — HIGH (ref 10.3–14.5)
SODIUM SERPL-SCNC: 133 MMOL/L — LOW (ref 135–145)
TROPONIN T, HIGH SENSITIVITY RESULT: 35 NG/L — SIGNIFICANT CHANGE UP (ref 0–51)
TROPONIN T, HIGH SENSITIVITY RESULT: 37 NG/L — SIGNIFICANT CHANGE UP (ref 0–51)
WBC # BLD: 9.99 K/UL — SIGNIFICANT CHANGE UP (ref 3.8–10.5)
WBC # FLD AUTO: 9.99 K/UL — SIGNIFICANT CHANGE UP (ref 3.8–10.5)

## 2024-02-27 PROCEDURE — 71045 X-RAY EXAM CHEST 1 VIEW: CPT | Mod: 26

## 2024-02-27 PROCEDURE — 99223 1ST HOSP IP/OBS HIGH 75: CPT

## 2024-02-27 PROCEDURE — 99291 CRITICAL CARE FIRST HOUR: CPT

## 2024-02-27 PROCEDURE — 93308 TTE F-UP OR LMTD: CPT | Mod: 26

## 2024-02-27 RX ORDER — DILTIAZEM HCL 120 MG
15 CAPSULE, EXT RELEASE 24 HR ORAL ONCE
Refills: 0 | Status: COMPLETED | OUTPATIENT
Start: 2024-02-27 | End: 2024-02-27

## 2024-02-27 RX ORDER — SODIUM CHLORIDE 9 MG/ML
250 INJECTION INTRAMUSCULAR; INTRAVENOUS; SUBCUTANEOUS ONCE
Refills: 0 | Status: COMPLETED | OUTPATIENT
Start: 2024-02-27 | End: 2024-02-27

## 2024-02-27 RX ORDER — ADENOSINE 3 MG/ML
6 INJECTION INTRAVENOUS ONCE
Refills: 0 | Status: DISCONTINUED | OUTPATIENT
Start: 2024-02-27 | End: 2024-02-27

## 2024-02-27 RX ORDER — ADENOSINE 3 MG/ML
12 INJECTION INTRAVENOUS ONCE
Refills: 0 | Status: COMPLETED | OUTPATIENT
Start: 2024-02-27 | End: 2024-02-27

## 2024-02-27 RX ORDER — METOPROLOL TARTRATE 50 MG
5 TABLET ORAL ONCE
Refills: 0 | Status: COMPLETED | OUTPATIENT
Start: 2024-02-27 | End: 2024-02-27

## 2024-02-27 RX ORDER — ADENOSINE 3 MG/ML
6 INJECTION INTRAVENOUS ONCE
Refills: 0 | Status: COMPLETED | OUTPATIENT
Start: 2024-02-27 | End: 2024-02-27

## 2024-02-27 RX ORDER — DILTIAZEM HCL 120 MG
10 CAPSULE, EXT RELEASE 24 HR ORAL
Qty: 125 | Refills: 0 | Status: DISCONTINUED | OUTPATIENT
Start: 2024-02-27 | End: 2024-02-28

## 2024-02-27 RX ORDER — METOPROLOL TARTRATE 50 MG
50 TABLET ORAL ONCE
Refills: 0 | Status: COMPLETED | OUTPATIENT
Start: 2024-02-27 | End: 2024-02-27

## 2024-02-27 RX ADMIN — ADENOSINE 12 MILLIGRAM(S): 3 INJECTION INTRAVENOUS at 17:20

## 2024-02-27 RX ADMIN — Medication 10 MG/HR: at 19:48

## 2024-02-27 RX ADMIN — Medication 50 MILLIGRAM(S): at 18:08

## 2024-02-27 RX ADMIN — SODIUM CHLORIDE 500 MILLILITER(S): 9 INJECTION INTRAMUSCULAR; INTRAVENOUS; SUBCUTANEOUS at 20:51

## 2024-02-27 RX ADMIN — ADENOSINE 12 MILLIGRAM(S): 3 INJECTION INTRAVENOUS at 17:25

## 2024-02-27 RX ADMIN — Medication 15 MILLIGRAM(S): at 19:37

## 2024-02-27 RX ADMIN — Medication 5 MILLIGRAM(S): at 18:07

## 2024-02-27 NOTE — ED PROVIDER NOTE - OBJECTIVE STATEMENT
53 M PMH sev MR and Aortic regurg s/p bio AVR and bio MVR 9/2015, AFlutter s/p ablation 1/2018, DM, HLD, and pheochromocytoma s/p adrenalectomy 53 M PMH sev MR and Aortic regurg s/p bio AVR and bio MVR 9/2015, AFlutter s/p ablation 1/2018, DM, HLD, and pheochromocytoma s/p adrenalectomy presenting to the ED with SVT from cards office associated with dyspnea on exertion for the past few weeks (no associated chest pain, cough, fever), vagal maneuvers trialed by cards without improvement. Denies recent illness, n/v/d/c, abdominal pain, dizziness. No calf pain or swelling.

## 2024-02-27 NOTE — H&P ADULT - NSHPLABSRESULTS_GEN_ALL_CORE
LABS:                      12.7   9.99  )-----------( 246      ( 27 Feb 2024 15:41 )             40.7     02-27    133<L>  |  100  |  38<H>  ----------------------------<  136<H>  4.9   |  23  |  2.02<H>    Ca    9.9      27 Feb 2024 15:41  Mg     2.1     02-27    TPro  8.5<H>  /  Alb  4.4  /  TBili  0.4  /  DBili  x   /  AST  35  /  ALT  26  /  AlkPhos  86  02-27    LIVER FUNCTIONS - ( 27 Feb 2024 15:41 )  Alb: 4.4 g/dL / Pro: 8.5 g/dL / ALK PHOS: 86 U/L / ALT: 26 U/L / AST: 35 U/L / GGT: x           PT/INR - ( 27 Feb 2024 15:41 )   PT: 23.8 sec;   INR: 2.21 ratio    PTT - ( 27 Feb 2024 15:41 )  PTT:33.6 sec    Urinalysis Basic - ( 27 Feb 2024 15:41 )  Color: x / Appearance: x / SG: x / pH: x  Gluc: 136 mg/dL / Ketone: x  / Bili: x / Urobili: x   Blood: x / Protein: x / Nitrite: x   Leuk Esterase: x / RBC: x / WBC x   Sq Epi: x / Non Sq Epi: x / Bacteria: x    IMAGING:  Xray Chest 1 View- PORTABLE-Urgent (02.27.24 @ 16:28)  IMPRESSION:  - Small right pleural effusion.

## 2024-02-27 NOTE — H&P ADULT - ASSESSMENT
Palliative consult at bedside.    52yo M w/ PMH of HTN, HLD, DM2, CAD, Afib, pheo s/p right adrenalectomy pw narrow complex tachycardia to 150s at cardiology clinic noted to have 2:1 Aflutter

## 2024-02-27 NOTE — H&P ADULT - NSHPPHYSICALEXAM_GEN_ALL_CORE
Vital Signs Last 24 Hrs  T(C): 36.6 (27 Feb 2024 19:45), Max: 36.6 (27 Feb 2024 19:45)  T(F): 97.8 (27 Feb 2024 19:45), Max: 97.8 (27 Feb 2024 19:45)  HR: 77 (27 Feb 2024 22:10) (77 - 161)  BP: 100/71 (27 Feb 2024 22:10) (96/73 - 122/86)  BP(mean): 95 (27 Feb 2024 20:11) (79 - 95)  RR: 16 (27 Feb 2024 22:10) (16 - 21)  SpO2: 99% (27 Feb 2024 22:10) (98% - 100%)    Parameters below as of 27 Feb 2024 22:10  Patient On (Oxygen Delivery Method): room air    CONSTITUTIONAL: Well-groomed, in no apparent distress  EYES: No conjunctival or scleral injection, non-icteric;   ENMT: No external nasal lesions; MMM, poor dentition  NECK: Trachea midline without palpable neck mass; thyroid not enlarged and non-tender  RESPIRATORY: Breathing comfortably; no dullness to percussion; lungs CTA without wheeze/rhonchi/rales  CARDIOVASCULAR: +S1S2, RRR, no M/G/R; pedal pulses full and symmetric; no lower extremity edema  GASTROINTESTINAL: No palpable masses or tenderness, +BS throughout, no rebound/guarding; no hepatosplenomegaly; no hernia palpated  LYMPHATIC: No cervical LAD or tenderness  SKIN: No rashes or ulcers noted  NEUROLOGIC: CN II-XII intact; sensation intact in LEs b/l to light touch  PSYCHIATRIC: A&Ox3; mood and affect appropriate; appropriate insight and judgment

## 2024-02-27 NOTE — ED ADULT NURSE NOTE - NSICDXPASTSURGICALHX_GEN_ALL_CORE_FT
PAST SURGICAL HISTORY:  H/O pheochromocytoma s/p Right Adrenalectomy    History of kidney surgery     History of open heart surgery     S/P AVR (aortic valve replacement)     S/P MVR (mitral valve replacement)

## 2024-02-27 NOTE — ED PROVIDER NOTE - CLINICAL SUMMARY MEDICAL DECISION MAKING FREE TEXT BOX
53-year-old male with history of aortic regurg status postrepair as well as a flutter status post ablation on warfarin 7.5 mg diabetes and hypertension p.o. status post adrenalectomy presenting with increased dyspnea on exertion dizziness being found to have tachycardia likely SVT in clinic and was referred here.  Patient denies any chest pain palpitations shortness of breath currently no recent infectious symptoms denies nausea vomiting urinary symptoms  On physical examination patient is well-appearing no acute distress clear lungs S1-S2 soft nontender abdomen well-perfused neuro grossly intact no peripheral edema  EKG appears to have complaints of SVT and cardiology was present and recommended a trial of adenosine bedside echo shows near normal ejection fraction however there is significant aortic regurg with complaint centimeters per liter and no obvious paravalvular Doppler signals no B-lines and no peripheral pleural effusion  Given this we will trial adenosine and rate control will send labs  Noted to have a creatinine of 2.2 which appears to be higher than his baseline likely in the setting of poor forefoot flow due to tachyarrhythmia

## 2024-02-27 NOTE — ED PROVIDER NOTE - PHYSICAL EXAMINATION
On physical examination patient is well-appearing no acute distress clear lungs S1-S2 soft nontender abdomen well-perfused neuro grossly intact no peripheral edema

## 2024-02-27 NOTE — H&P ADULT - PROBLEM SELECTOR PLAN 2
- Likely i/s/o above  - Cr on admission 2.02 w/ baseline ~1  - Holding home Enalapril and Chlorthalidone   - Avoid nephrotoxic medications  - Renally dose all medications

## 2024-02-27 NOTE — ED ADULT NURSE REASSESSMENT NOTE - NS ED NURSE REASSESS COMMENT FT1
12 mg adenosine administered as per MD order at 1720, . 2 lines in place with continuous 12 lead EKG attached to patient, on cardiac monitor, MD and RN at bedside. 2nd dose 12mg adenosine administered as per MD order for unchanged HR of 158. HR unchanged from 2nd dose. pending cardiology consult, pt resting in bed on cardiac monitor.
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
## 2024-02-27 NOTE — ED PROVIDER NOTE - NSICDXPASTSURGICALHX_GEN_ALL_CORE_FT
weakness
PAST SURGICAL HISTORY:  H/O pheochromocytoma s/p Right Adrenalectomy    History of kidney surgery     History of open heart surgery     S/P AVR (aortic valve replacement)     S/P MVR (mitral valve replacement)

## 2024-02-27 NOTE — ED ADULT NURSE NOTE - OBJECTIVE STATEMENT
53 year old male presented to ED from Cardiologist 53 year old male presented to ED from Cardiologist office with c/o of rapid heart rate ~160's. vagal maneuvers w/ carotid massage performed in office with no relief. hx A flutter c/p ablation 1/2018, DM, HLD, HCV, pheochromocytoma. pt denies CP, SOB, nausea/vomiting, numbness/tingling, fever, cough, chills, dizziness, headache, blurred vision, neuro intact. pt a&ox3, lung sounds clear, heart rate tachy ~160, on cardiac monitor EKG performed, abdomen soft nontender nondistended to palp. skin intact. IV in right forearm 20G and patent. Will continue to monitor and assess while offering support and reassurance.

## 2024-02-27 NOTE — CONSULT NOTE ADULT - ATTENDING COMMENTS
s/p AVR/MVR and prior flutter ablation at OSH here with symptomatic and recurrent flutter, likely atypical. Plan for ablation.
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
## 2024-02-27 NOTE — REASON FOR VISIT
[FreeTextEntry1] : 52 yo M with a PMH significant for severe MR and AI s/p bioprosthetic AVR, MVR in 9/2015, atrial flutter s/p ablation(1/2018), IDDM, HLD, HCV, pheochromocytoma s/p adrenalectomy who presents for follow up.  LCV 11/23: Today notes he is doing well. Had labs with PCP which showed his A1c is 7.7 from 7.5, INR has been 1.7-3.4, CBC CMP TSH PSA unremarkable. Needs HBV vaccine. Otherwise doing well, denies FC NV CP SOB. He has been actively trying to lose weight, lost 20 lbs, eating less walking 30-40 min. Has not been checking BP at home but has a machine. PCP to order meds as needed.  IE: None  Today patient reports

## 2024-02-27 NOTE — ASSESSMENT
[FreeTextEntry1] : 54 yo M with a PMH significant for severe MR and AI s/p bioprosthetic AVR, MVR in 9/2015, atrial flutter s/p ablation (1/2018), IDDM, HLD, HCV, pheochromocytoma s/p adrenalectomy who presents for follow up.  #HLD - Last LDL 58 in 7/2023 - Cont home lipitor and zetia  #HTN - Home -120s/80s-90s - Cont Chlorthalidone 25 Enalipril 5 and MTP Succ 100 QDay - Can consider inc enalapril on next visit if diastolics still high  #Hx of Bio MVR and AVR - Aspirin for bioprosthetic MVR - Cont Metoprolol Succ 100 QDay - Repeat TTE  #AFib/Flutter - Still in rate controlled AFib today - Cont home MTP Succ 100 QDay and Coumadin 7.5 QDay, Goal INR 2-3  #Overweight - Discussed healthy eating strategies with more fruits/vegetables/lean meats and less bread  RTC 3 mths per pt preference  Colton Salas PGY5 Cardiology Fellow  HUSSAIN Mosley.

## 2024-02-27 NOTE — H&P ADULT - PROBLEM/PLAN-3
Phone call made to patient to discuss urine culture results.  No answer.  Voicemail left to patient to return phone call to discuss results.     DISPLAY PLAN FREE TEXT

## 2024-02-27 NOTE — H&P ADULT - PROBLEM SELECTOR PLAN 1
- s/p Adenosine w/out improvement, Lopressor IVP and Dilt followed by Dilt gtt   - Will discontinue dilt gtt pending TTE to fully assess EF and start Lopressor 50mg q6hrs   - c/w home Coumadin dosed daily based on INR (takes 7.5mg qd at home)  - TTE in AM  - Monitor on tele  - NPO @ [facility] for possible DCCV in AM

## 2024-02-27 NOTE — ED PROVIDER NOTE - PROGRESS NOTE DETAILS
Letitia Stevens DO (PGY3): Pt received adenosine 12mg x2 without improvement in HR. HR 150s throughout ED stay. Normotensive, stable at this time. Spoke with cards, recommended metoprolol, no improvement so will give cardizem and reassess.

## 2024-02-27 NOTE — CONSULT NOTE ADULT - ASSESSMENT
54 yo M with PMH of sev MR and AI s/p bio AVR and bio MVR 9/2015, AFlutter s/p ablation 1/2018, DM, HLD, HCV (neg VL), and pheochromocytoma s/p adrenalectomy presenting initially to cardiology clinic, noted to be in a narrow complex tachycardia to 150s, likely 2:1 atypical flutter, asymptomatic. Patient is compliant with anticoagulation, INR is 2.21. Will rate control overnight, plan for possible DCCV tomorrow.    Recs:  -monitor on telemetry  -start metop 50 Q6, although note that rate control may be difficult in atrial flutter  -continue warfarin   -continue home meds: ASA, enalapril, chlorthalidone, atorvastatin  -obtain TTE  -NPO at MN for possible DCCV in AM 54 yo M with PMH of sev MR and AI s/p bio AVR and bio MVR 9/2015, AFlutter s/p ablation 1/2018, DM, HLD, HCV (neg VL), and pheochromocytoma s/p adrenalectomy presenting initially to cardiology clinic, noted to be in a narrow complex tachycardia to 150s, likely 2:1 atypical flutter, asymptomatic. Patient is compliant with anticoagulation, INR is 2.21. Will rate control overnight, plan for possible DCCV tomorrow.    Recs:  -monitor on telemetry  -check TSH  -start metop 50 Q6, although note that rate control may be difficult in atrial flutter  -continue warfarin   -continue home meds: ASA, enalapril, chlorthalidone, atorvastatin  -obtain TTE  -NPO at MN for possible DCCV in AM

## 2024-02-28 DIAGNOSIS — I48.4 ATYPICAL ATRIAL FLUTTER: ICD-10-CM

## 2024-02-28 DIAGNOSIS — E11.9 TYPE 2 DIABETES MELLITUS WITHOUT COMPLICATIONS: ICD-10-CM

## 2024-02-28 DIAGNOSIS — Z29.9 ENCOUNTER FOR PROPHYLACTIC MEASURES, UNSPECIFIED: ICD-10-CM

## 2024-02-28 DIAGNOSIS — I10 ESSENTIAL (PRIMARY) HYPERTENSION: ICD-10-CM

## 2024-02-28 DIAGNOSIS — I25.10 ATHEROSCLEROTIC HEART DISEASE OF NATIVE CORONARY ARTERY WITHOUT ANGINA PECTORIS: ICD-10-CM

## 2024-02-28 DIAGNOSIS — Z02.9 ENCOUNTER FOR ADMINISTRATIVE EXAMINATIONS, UNSPECIFIED: ICD-10-CM

## 2024-02-28 DIAGNOSIS — E78.5 HYPERLIPIDEMIA, UNSPECIFIED: ICD-10-CM

## 2024-02-28 DIAGNOSIS — N17.9 ACUTE KIDNEY FAILURE, UNSPECIFIED: ICD-10-CM

## 2024-02-28 LAB
A1C WITH ESTIMATED AVERAGE GLUCOSE RESULT: 6.5 % — HIGH (ref 4–5.6)
ALBUMIN SERPL ELPH-MCNC: 4.2 G/DL — SIGNIFICANT CHANGE UP (ref 3.3–5)
ALP SERPL-CCNC: 81 U/L — SIGNIFICANT CHANGE UP (ref 40–120)
ALT FLD-CCNC: 22 U/L — SIGNIFICANT CHANGE UP (ref 10–45)
ANION GAP SERPL CALC-SCNC: 12 MMOL/L — SIGNIFICANT CHANGE UP (ref 5–17)
AST SERPL-CCNC: 35 U/L — SIGNIFICANT CHANGE UP (ref 10–40)
BASOPHILS # BLD AUTO: 0.07 K/UL — SIGNIFICANT CHANGE UP (ref 0–0.2)
BASOPHILS NFR BLD AUTO: 0.8 % — SIGNIFICANT CHANGE UP (ref 0–2)
BILIRUB SERPL-MCNC: 0.6 MG/DL — SIGNIFICANT CHANGE UP (ref 0.2–1.2)
BUN SERPL-MCNC: 36 MG/DL — HIGH (ref 7–23)
CALCIUM SERPL-MCNC: 9.7 MG/DL — SIGNIFICANT CHANGE UP (ref 8.4–10.5)
CHLORIDE SERPL-SCNC: 101 MMOL/L — SIGNIFICANT CHANGE UP (ref 96–108)
CO2 SERPL-SCNC: 23 MMOL/L — SIGNIFICANT CHANGE UP (ref 22–31)
CREAT SERPL-MCNC: 1.52 MG/DL — HIGH (ref 0.5–1.3)
EGFR: 55 ML/MIN/1.73M2 — LOW
EOSINOPHIL # BLD AUTO: 0.19 K/UL — SIGNIFICANT CHANGE UP (ref 0–0.5)
EOSINOPHIL NFR BLD AUTO: 2.2 % — SIGNIFICANT CHANGE UP (ref 0–6)
ESTIMATED AVERAGE GLUCOSE: 140 MG/DL — HIGH (ref 68–114)
GLUCOSE BLDC GLUCOMTR-MCNC: 134 MG/DL — HIGH (ref 70–99)
GLUCOSE BLDC GLUCOMTR-MCNC: 135 MG/DL — HIGH (ref 70–99)
GLUCOSE BLDC GLUCOMTR-MCNC: 157 MG/DL — HIGH (ref 70–99)
GLUCOSE BLDC GLUCOMTR-MCNC: 188 MG/DL — HIGH (ref 70–99)
GLUCOSE SERPL-MCNC: 134 MG/DL — HIGH (ref 70–99)
HCT VFR BLD CALC: 37 % — LOW (ref 39–50)
HGB BLD-MCNC: 12 G/DL — LOW (ref 13–17)
IMM GRANULOCYTES NFR BLD AUTO: 0.6 % — SIGNIFICANT CHANGE UP (ref 0–0.9)
INR BLD: 2.5 RATIO — HIGH (ref 0.85–1.18)
LYMPHOCYTES # BLD AUTO: 2.36 K/UL — SIGNIFICANT CHANGE UP (ref 1–3.3)
LYMPHOCYTES # BLD AUTO: 27 % — SIGNIFICANT CHANGE UP (ref 13–44)
MAGNESIUM SERPL-MCNC: 2.1 MG/DL — SIGNIFICANT CHANGE UP (ref 1.6–2.6)
MCHC RBC-ENTMCNC: 28.7 PG — SIGNIFICANT CHANGE UP (ref 27–34)
MCHC RBC-ENTMCNC: 32.4 GM/DL — SIGNIFICANT CHANGE UP (ref 32–36)
MCV RBC AUTO: 88.5 FL — SIGNIFICANT CHANGE UP (ref 80–100)
MONOCYTES # BLD AUTO: 1.1 K/UL — HIGH (ref 0–0.9)
MONOCYTES NFR BLD AUTO: 12.6 % — SIGNIFICANT CHANGE UP (ref 2–14)
NEUTROPHILS # BLD AUTO: 4.98 K/UL — SIGNIFICANT CHANGE UP (ref 1.8–7.4)
NEUTROPHILS NFR BLD AUTO: 56.8 % — SIGNIFICANT CHANGE UP (ref 43–77)
NRBC # BLD: 0 /100 WBCS — SIGNIFICANT CHANGE UP (ref 0–0)
PHOSPHATE SERPL-MCNC: 3.6 MG/DL — SIGNIFICANT CHANGE UP (ref 2.5–4.5)
PLATELET # BLD AUTO: 200 K/UL — SIGNIFICANT CHANGE UP (ref 150–400)
POTASSIUM SERPL-MCNC: 4.2 MMOL/L — SIGNIFICANT CHANGE UP (ref 3.5–5.3)
POTASSIUM SERPL-SCNC: 4.2 MMOL/L — SIGNIFICANT CHANGE UP (ref 3.5–5.3)
PROT SERPL-MCNC: 7.9 G/DL — SIGNIFICANT CHANGE UP (ref 6–8.3)
PROTHROM AB SERPL-ACNC: 25.6 SEC — HIGH (ref 9.5–13)
RBC # BLD: 4.18 M/UL — LOW (ref 4.2–5.8)
RBC # FLD: 15.4 % — HIGH (ref 10.3–14.5)
SODIUM SERPL-SCNC: 136 MMOL/L — SIGNIFICANT CHANGE UP (ref 135–145)
T3 SERPL-MCNC: 120 NG/DL — SIGNIFICANT CHANGE UP (ref 80–200)
T4 AB SER-ACNC: 8.4 UG/DL — SIGNIFICANT CHANGE UP (ref 4.6–12)
TSH SERPL-MCNC: 1.94 UIU/ML — SIGNIFICANT CHANGE UP (ref 0.27–4.2)
TSH SERPL-MCNC: 2 UIU/ML — SIGNIFICANT CHANGE UP (ref 0.27–4.2)
WBC # BLD: 8.75 K/UL — SIGNIFICANT CHANGE UP (ref 3.8–10.5)
WBC # FLD AUTO: 8.75 K/UL — SIGNIFICANT CHANGE UP (ref 3.8–10.5)

## 2024-02-28 PROCEDURE — 99233 SBSQ HOSP IP/OBS HIGH 50: CPT

## 2024-02-28 PROCEDURE — 99223 1ST HOSP IP/OBS HIGH 75: CPT

## 2024-02-28 RX ORDER — ACETAMINOPHEN 500 MG
650 TABLET ORAL EVERY 6 HOURS
Refills: 0 | Status: DISCONTINUED | OUTPATIENT
Start: 2024-02-28 | End: 2024-03-03

## 2024-02-28 RX ORDER — DEXTROSE 50 % IN WATER 50 %
15 SYRINGE (ML) INTRAVENOUS ONCE
Refills: 0 | Status: DISCONTINUED | OUTPATIENT
Start: 2024-02-28 | End: 2024-03-03

## 2024-02-28 RX ORDER — GLUCAGON INJECTION, SOLUTION 0.5 MG/.1ML
1 INJECTION, SOLUTION SUBCUTANEOUS ONCE
Refills: 0 | Status: DISCONTINUED | OUTPATIENT
Start: 2024-02-28 | End: 2024-03-03

## 2024-02-28 RX ORDER — METOPROLOL TARTRATE 50 MG
50 TABLET ORAL EVERY 6 HOURS
Refills: 0 | Status: DISCONTINUED | OUTPATIENT
Start: 2024-02-28 | End: 2024-03-03

## 2024-02-28 RX ORDER — DEXTROSE 50 % IN WATER 50 %
25 SYRINGE (ML) INTRAVENOUS ONCE
Refills: 0 | Status: DISCONTINUED | OUTPATIENT
Start: 2024-02-28 | End: 2024-03-03

## 2024-02-28 RX ORDER — WARFARIN SODIUM 2.5 MG/1
7.5 TABLET ORAL ONCE
Refills: 0 | Status: COMPLETED | OUTPATIENT
Start: 2024-02-28 | End: 2024-02-28

## 2024-02-28 RX ORDER — METOPROLOL TARTRATE 50 MG
5 TABLET ORAL ONCE
Refills: 0 | Status: COMPLETED | OUTPATIENT
Start: 2024-02-28 | End: 2024-02-28

## 2024-02-28 RX ORDER — DEXTROSE 50 % IN WATER 50 %
12.5 SYRINGE (ML) INTRAVENOUS ONCE
Refills: 0 | Status: DISCONTINUED | OUTPATIENT
Start: 2024-02-28 | End: 2024-03-03

## 2024-02-28 RX ORDER — INFLUENZA VIRUS VACCINE 15; 15; 15; 15 UG/.5ML; UG/.5ML; UG/.5ML; UG/.5ML
0.5 SUSPENSION INTRAMUSCULAR ONCE
Refills: 0 | Status: DISCONTINUED | OUTPATIENT
Start: 2024-02-28 | End: 2024-03-03

## 2024-02-28 RX ORDER — SODIUM CHLORIDE 9 MG/ML
1000 INJECTION, SOLUTION INTRAVENOUS
Refills: 0 | Status: DISCONTINUED | OUTPATIENT
Start: 2024-02-28 | End: 2024-03-03

## 2024-02-28 RX ORDER — ASPIRIN/CALCIUM CARB/MAGNESIUM 324 MG
81 TABLET ORAL DAILY
Refills: 0 | Status: DISCONTINUED | OUTPATIENT
Start: 2024-02-28 | End: 2024-03-03

## 2024-02-28 RX ORDER — ATORVASTATIN CALCIUM 80 MG/1
80 TABLET, FILM COATED ORAL AT BEDTIME
Refills: 0 | Status: DISCONTINUED | OUTPATIENT
Start: 2024-02-28 | End: 2024-03-03

## 2024-02-28 RX ORDER — DILTIAZEM HCL 120 MG
10 CAPSULE, EXT RELEASE 24 HR ORAL ONCE
Refills: 0 | Status: COMPLETED | OUTPATIENT
Start: 2024-02-28 | End: 2024-02-28

## 2024-02-28 RX ORDER — PANTOPRAZOLE SODIUM 20 MG/1
40 TABLET, DELAYED RELEASE ORAL
Refills: 0 | Status: DISCONTINUED | OUTPATIENT
Start: 2024-02-28 | End: 2024-03-03

## 2024-02-28 RX ORDER — INSULIN LISPRO 100/ML
VIAL (ML) SUBCUTANEOUS EVERY 6 HOURS
Refills: 0 | Status: DISCONTINUED | OUTPATIENT
Start: 2024-02-28 | End: 2024-02-28

## 2024-02-28 RX ORDER — DILTIAZEM HCL 120 MG
30 CAPSULE, EXT RELEASE 24 HR ORAL EVERY 6 HOURS
Refills: 0 | Status: DISCONTINUED | OUTPATIENT
Start: 2024-02-28 | End: 2024-03-01

## 2024-02-28 RX ORDER — INSULIN LISPRO 100/ML
VIAL (ML) SUBCUTANEOUS EVERY 6 HOURS
Refills: 0 | Status: DISCONTINUED | OUTPATIENT
Start: 2024-02-28 | End: 2024-02-29

## 2024-02-28 RX ORDER — CHLORTHALIDONE 50 MG
25 TABLET ORAL DAILY
Refills: 0 | Status: DISCONTINUED | OUTPATIENT
Start: 2024-02-28 | End: 2024-02-28

## 2024-02-28 RX ADMIN — PANTOPRAZOLE SODIUM 40 MILLIGRAM(S): 20 TABLET, DELAYED RELEASE ORAL at 05:32

## 2024-02-28 RX ADMIN — ATORVASTATIN CALCIUM 80 MILLIGRAM(S): 80 TABLET, FILM COATED ORAL at 21:25

## 2024-02-28 RX ADMIN — Medication 50 MILLIGRAM(S): at 05:32

## 2024-02-28 RX ADMIN — Medication 5 MILLIGRAM(S): at 23:08

## 2024-02-28 RX ADMIN — Medication 1: at 18:17

## 2024-02-28 RX ADMIN — Medication 50 MILLIGRAM(S): at 18:17

## 2024-02-28 RX ADMIN — Medication 30 MILLIGRAM(S): at 23:36

## 2024-02-28 RX ADMIN — WARFARIN SODIUM 7.5 MILLIGRAM(S): 2.5 TABLET ORAL at 21:25

## 2024-02-28 RX ADMIN — Medication 50 MILLIGRAM(S): at 23:08

## 2024-02-28 RX ADMIN — Medication 81 MILLIGRAM(S): at 11:40

## 2024-02-28 RX ADMIN — WARFARIN SODIUM 7.5 MILLIGRAM(S): 2.5 TABLET ORAL at 02:36

## 2024-02-28 RX ADMIN — Medication 10 MILLIGRAM(S): at 23:36

## 2024-02-28 RX ADMIN — Medication 50 MILLIGRAM(S): at 11:41

## 2024-02-28 NOTE — PROGRESS NOTE ADULT - PROBLEM SELECTOR PLAN 1
Continue Lopressor 50 mg PO Q6  SUJATHA/DCCV cancelled- plan for ablation on 3/1  F/u TTE  Continue coumadin

## 2024-02-28 NOTE — PATIENT PROFILE ADULT - FALL HARM RISK - HARM RISK INTERVENTIONS

## 2024-02-28 NOTE — PROGRESS NOTE ADULT - ASSESSMENT
53M hx HTN, HLD, DM type 2, CAD, AF, pheochromocytoma s/p right adrenalectomy p/w rapid atrial flutter now rate controlled

## 2024-02-28 NOTE — PROGRESS NOTE ADULT - SUBJECTIVE AND OBJECTIVE BOX
Landon Meza MD    Patient is a 53y old  Male who presents with a chief complaint of Aflutter (28 Feb 2024 11:45)        SUBJECTIVE / OVERNIGHT EVENTS: Patient seen and examined.   TELEMETRY: AF 70's      MEDICATIONS  (STANDING):  aspirin enteric coated 81 milliGRAM(s) Oral daily  atorvastatin 80 milliGRAM(s) Oral at bedtime  dextrose 5%. 1000 milliLiter(s) (50 mL/Hr) IV Continuous <Continuous>  dextrose 5%. 1000 milliLiter(s) (100 mL/Hr) IV Continuous <Continuous>  dextrose 50% Injectable 12.5 Gram(s) IV Push once  dextrose 50% Injectable 25 Gram(s) IV Push once  dextrose 50% Injectable 25 Gram(s) IV Push once  glucagon  Injectable 1 milliGRAM(s) IntraMuscular once  insulin lispro (ADMELOG) corrective regimen sliding scale   SubCutaneous every 6 hours  metoprolol tartrate 50 milliGRAM(s) Oral every 6 hours  pantoprazole    Tablet 40 milliGRAM(s) Oral before breakfast    MEDICATIONS  (PRN):  acetaminophen     Tablet .. 650 milliGRAM(s) Oral every 6 hours PRN Temp greater or equal to 38C (100.4F), Mild Pain (1 - 3)  dextrose Oral Gel 15 Gram(s) Oral once PRN Blood Glucose LESS THAN 70 milliGRAM(s)/deciliter      Vital Signs Last 24 Hrs  T(C): 36.4 (28 Feb 2024 12:11), Max: 36.6 (27 Feb 2024 19:45)  T(F): 97.6 (28 Feb 2024 12:11), Max: 97.8 (27 Feb 2024 19:45)  HR: 84 (28 Feb 2024 12:11) (70 - 161)  BP: 122/81 (28 Feb 2024 12:11) (96/73 - 122/86)  BP(mean): 95 (27 Feb 2024 20:11) (79 - 95)  RR: 18 (28 Feb 2024 12:11) (16 - 21)  SpO2: 96% (28 Feb 2024 12:11) (96% - 100%)    Parameters below as of 28 Feb 2024 12:11  Patient On (Oxygen Delivery Method): room air      CAPILLARY BLOOD GLUCOSE      POCT Blood Glucose.: 134 mg/dL (28 Feb 2024 11:26)  POCT Blood Glucose.: 135 mg/dL (28 Feb 2024 05:38)    I&O's Summary        PHYSICAL EXAM  GENERAL: NAD, well-developed  HEAD:  Atraumatic, normocephalic  EYES: EOMI, PERRLA, conjunctiva and sclera clear  CHEST/LUNG: Clear to auscultation bilaterally; no wheezes  HEART: +S1+S2, regular rate and rhythm  ABDOMEN: Soft, nontender, nondistended; bowel sounds present  EXTREMITIES:  2+ peripheral pulses; no clubbing, cyanosis, or edema  PSYCH: AAOx3      LABS:                        12.0   8.75  )-----------( 200      ( 28 Feb 2024 06:59 )             37.0     02-28    136  |  101  |  36<H>  ----------------------------<  134<H>  4.2   |  23  |  1.52<H>    Ca    9.7      28 Feb 2024 06:59  Phos  3.6     02-28  Mg     2.1     02-28    TPro  7.9  /  Alb  4.2  /  TBili  0.6  /  DBili  x   /  AST  35  /  ALT  22  /  AlkPhos  81  02-28    PT/INR - ( 28 Feb 2024 11:41 )   PT: 25.6 sec;   INR: 2.50 ratio         PTT - ( 27 Feb 2024 15:41 )  PTT:33.6 sec      Urinalysis Basic - ( 28 Feb 2024 06:59 )    Color: x / Appearance: x / SG: x / pH: x  Gluc: 134 mg/dL / Ketone: x  / Bili: x / Urobili: x   Blood: x / Protein: x / Nitrite: x   Leuk Esterase: x / RBC: x / WBC x   Sq Epi: x / Non Sq Epi: x / Bacteria: x          RADIOLOGY & ADDITIONAL TESTS:    Imaging Personally Reviewed:  Consultant(s) Notes Reviewed:    Care Discussed with Consultants/Other Providers:

## 2024-02-28 NOTE — PROGRESS NOTE ADULT - SUBJECTIVE AND OBJECTIVE BOX
ID: 52 yo M with PMH of sev MR and AI s/p bio AVR and bio MVR 9/2015, AFib/AFlutter s/p ablation 1/2018 on AC, DM, HLD, HCV (neg VL), and pheochromocytoma s/p adrenalectomy presenting initially to cardiology clinic, noted to be in a narrow complex tachycardia to 150. Now found to be in AFlutter    Patient seen and examined at bedside.    Overnight Events: Pt SVT converted to 4:1 AFlutter after AVN agents. Notes that he is feeling much better today, denies any FC NC CP SOB. Spoke to pt about AAD vs DCCV vs ablation, pt amenable to whatever EP team thinks is appropriate. Wife and cousin at bedside with Frisian     Telemetry: AFlutter     Review of Systems: Negative except as per HPI     Current Meds:  acetaminophen     Tablet .. 650 milliGRAM(s) Oral every 6 hours PRN  aspirin enteric coated 81 milliGRAM(s) Oral daily  atorvastatin 80 milliGRAM(s) Oral at bedtime  dextrose 5%. 1000 milliLiter(s) IV Continuous <Continuous>  dextrose 5%. 1000 milliLiter(s) IV Continuous <Continuous>  dextrose 50% Injectable 12.5 Gram(s) IV Push once  dextrose 50% Injectable 25 Gram(s) IV Push once  dextrose 50% Injectable 25 Gram(s) IV Push once  dextrose Oral Gel 15 Gram(s) Oral once PRN  glucagon  Injectable 1 milliGRAM(s) IntraMuscular once  insulin lispro (ADMELOG) corrective regimen sliding scale   SubCutaneous every 6 hours  metoprolol tartrate 50 milliGRAM(s) Oral every 6 hours  pantoprazole    Tablet 40 milliGRAM(s) Oral before breakfast      Vitals:  T(F): 97.8 (02-27), Max: 97.8 (02-27)  HR: 70 (02-28) (70 - 161)  BP: 100/71 (02-27) (96/73 - 122/86)  RR: 16 (02-27)  SpO2: 99% (02-27)  I&O's Summary      Physical Exam:  Appearance: No acute distress; well appearing  Eyes: PERRL, EOMI, pink conjunctiva  HEENT: Normal oral mucosa  Cardiovascular: RRR, S1, S2, no murmurs, rubs, or gallops; no edema; no JVD  Respiratory: Clear to auscultation bilaterally  Gastrointestinal: soft, non-tender, non-distended with normal bowel sounds  Musculoskeletal: No clubbing; no joint deformity   Neurologic: Non-focal  Lymphatic: No lymphadenopathy  Psychiatry: AAOx3, mood & affect appropriate  Skin: No rashes, ecchymoses, or cyanosis                          12.0   8.75  )-----------( 200      ( 28 Feb 2024 06:59 )             37.0     02-28    136  |  101  |  36<H>  ----------------------------<  134<H>  4.2   |  23  |  1.52<H>    Ca    9.7      28 Feb 2024 06:59  Phos  3.6     02-28  Mg     2.1     02-28    TPro  7.9  /  Alb  4.2  /  TBili  0.6  /  DBili  x   /  AST  35  /  ALT  22  /  AlkPhos  81  02-28    PT/INR - ( 27 Feb 2024 15:41 )   PT: 23.8 sec;   INR: 2.21 ratio    PTT - ( 27 Feb 2024 15:41 )  PTT:33.6 sec    CARDIAC MARKERS ( 27 Feb 2024 16:55 )  35 ng/L / x     / x     / x     / x     / x      CARDIAC MARKERS ( 27 Feb 2024 15:41 )  37 ng/L / x     / x     / x     / x     / x        A/P  52 yo M with PMH of sev MR and AI s/p bio AVR and bio MVR 9/2015, AFib/AFlutter s/p ablation 1/2018 on AC, DM, HLD, HCV (neg VL), and pheochromocytoma s/p adrenalectomy presenting initially to cardiology clinic, noted to be in a narrow complex tachycardia to 150. Now found to be in AFlutter    #Typical AFlutter  - Pt has known hx of AFib/Flutter on coumadin at home      > Pt endorses 100% compliance, INR 2.21 today  - Has hx of ablation but likely OSH as no procedure notes in sunrise or allscipts  - Now rate controlled in 4:1 AFlutter  Plan:  - Cont home coumadin   - No need to be NPO today, not going to DCCV, will plan on AFlutter ablation on Friday  - Cont MTP 50 Q6  - F/U TTE  - Cont tele      Colton Salas PGY5  Cardiology Fellow    HUSSAIN Choe

## 2024-02-29 ENCOUNTER — RESULT REVIEW (OUTPATIENT)
Age: 54
End: 2024-02-29

## 2024-02-29 LAB
ANION GAP SERPL CALC-SCNC: 12 MMOL/L — SIGNIFICANT CHANGE UP (ref 5–17)
ANION GAP SERPL CALC-SCNC: 12 MMOL/L — SIGNIFICANT CHANGE UP (ref 5–17)
BUN SERPL-MCNC: 30 MG/DL — HIGH (ref 7–23)
BUN SERPL-MCNC: 34 MG/DL — HIGH (ref 7–23)
CALCIUM SERPL-MCNC: 9.4 MG/DL — SIGNIFICANT CHANGE UP (ref 8.4–10.5)
CALCIUM SERPL-MCNC: 9.8 MG/DL — SIGNIFICANT CHANGE UP (ref 8.4–10.5)
CHLORIDE SERPL-SCNC: 101 MMOL/L — SIGNIFICANT CHANGE UP (ref 96–108)
CHLORIDE SERPL-SCNC: 103 MMOL/L — SIGNIFICANT CHANGE UP (ref 96–108)
CO2 SERPL-SCNC: 21 MMOL/L — LOW (ref 22–31)
CO2 SERPL-SCNC: 23 MMOL/L — SIGNIFICANT CHANGE UP (ref 22–31)
CREAT SERPL-MCNC: 1.28 MG/DL — SIGNIFICANT CHANGE UP (ref 0.5–1.3)
CREAT SERPL-MCNC: 1.41 MG/DL — HIGH (ref 0.5–1.3)
EGFR: 60 ML/MIN/1.73M2 — SIGNIFICANT CHANGE UP
EGFR: 67 ML/MIN/1.73M2 — SIGNIFICANT CHANGE UP
GLUCOSE BLDC GLUCOMTR-MCNC: 122 MG/DL — HIGH (ref 70–99)
GLUCOSE BLDC GLUCOMTR-MCNC: 150 MG/DL — HIGH (ref 70–99)
GLUCOSE BLDC GLUCOMTR-MCNC: 151 MG/DL — HIGH (ref 70–99)
GLUCOSE BLDC GLUCOMTR-MCNC: 161 MG/DL — HIGH (ref 70–99)
GLUCOSE BLDC GLUCOMTR-MCNC: 217 MG/DL — HIGH (ref 70–99)
GLUCOSE SERPL-MCNC: 137 MG/DL — HIGH (ref 70–99)
GLUCOSE SERPL-MCNC: 150 MG/DL — HIGH (ref 70–99)
HCT VFR BLD CALC: 35.4 % — LOW (ref 39–50)
HGB BLD-MCNC: 11.5 G/DL — LOW (ref 13–17)
INR BLD: 2.67 RATIO — HIGH (ref 0.85–1.18)
MAGNESIUM SERPL-MCNC: 2.1 MG/DL — SIGNIFICANT CHANGE UP (ref 1.6–2.6)
MCHC RBC-ENTMCNC: 28.4 PG — SIGNIFICANT CHANGE UP (ref 27–34)
MCHC RBC-ENTMCNC: 32.5 GM/DL — SIGNIFICANT CHANGE UP (ref 32–36)
MCV RBC AUTO: 87.4 FL — SIGNIFICANT CHANGE UP (ref 80–100)
NRBC # BLD: 0 /100 WBCS — SIGNIFICANT CHANGE UP (ref 0–0)
PHOSPHATE SERPL-MCNC: 3.8 MG/DL — SIGNIFICANT CHANGE UP (ref 2.5–4.5)
PLATELET # BLD AUTO: 187 K/UL — SIGNIFICANT CHANGE UP (ref 150–400)
POTASSIUM SERPL-MCNC: 3.8 MMOL/L — SIGNIFICANT CHANGE UP (ref 3.5–5.3)
POTASSIUM SERPL-MCNC: 4.5 MMOL/L — SIGNIFICANT CHANGE UP (ref 3.5–5.3)
POTASSIUM SERPL-SCNC: 3.8 MMOL/L — SIGNIFICANT CHANGE UP (ref 3.5–5.3)
POTASSIUM SERPL-SCNC: 4.5 MMOL/L — SIGNIFICANT CHANGE UP (ref 3.5–5.3)
PROTHROM AB SERPL-ACNC: 27.2 SEC — HIGH (ref 9.5–13)
RBC # BLD: 4.05 M/UL — LOW (ref 4.2–5.8)
RBC # FLD: 15.1 % — HIGH (ref 10.3–14.5)
SODIUM SERPL-SCNC: 136 MMOL/L — SIGNIFICANT CHANGE UP (ref 135–145)
SODIUM SERPL-SCNC: 136 MMOL/L — SIGNIFICANT CHANGE UP (ref 135–145)
WBC # BLD: 8.3 K/UL — SIGNIFICANT CHANGE UP (ref 3.8–10.5)
WBC # FLD AUTO: 8.3 K/UL — SIGNIFICANT CHANGE UP (ref 3.8–10.5)

## 2024-02-29 PROCEDURE — 93656 COMPRE EP EVAL ABLTJ ATR FIB: CPT

## 2024-02-29 PROCEDURE — 99231 SBSQ HOSP IP/OBS SF/LOW 25: CPT

## 2024-02-29 PROCEDURE — 93306 TTE W/DOPPLER COMPLETE: CPT | Mod: 26

## 2024-02-29 PROCEDURE — 93010 ELECTROCARDIOGRAM REPORT: CPT | Mod: 76

## 2024-02-29 PROCEDURE — 93655 ICAR CATH ABLTJ DSCRT ARRHYT: CPT

## 2024-02-29 RX ORDER — WARFARIN SODIUM 2.5 MG/1
7.5 TABLET ORAL ONCE
Refills: 0 | Status: COMPLETED | OUTPATIENT
Start: 2024-02-29 | End: 2024-02-29

## 2024-02-29 RX ORDER — INSULIN LISPRO 100/ML
VIAL (ML) SUBCUTANEOUS
Refills: 0 | Status: DISCONTINUED | OUTPATIENT
Start: 2024-02-29 | End: 2024-03-03

## 2024-02-29 RX ORDER — INSULIN LISPRO 100/ML
VIAL (ML) SUBCUTANEOUS AT BEDTIME
Refills: 0 | Status: DISCONTINUED | OUTPATIENT
Start: 2024-02-29 | End: 2024-03-03

## 2024-02-29 RX ADMIN — Medication 1: at 18:02

## 2024-02-29 RX ADMIN — Medication 30 MILLIGRAM(S): at 18:02

## 2024-02-29 RX ADMIN — Medication 30 MILLIGRAM(S): at 05:40

## 2024-02-29 RX ADMIN — Medication 81 MILLIGRAM(S): at 18:02

## 2024-02-29 RX ADMIN — Medication 1: at 00:29

## 2024-02-29 RX ADMIN — Medication 50 MILLIGRAM(S): at 18:02

## 2024-02-29 RX ADMIN — WARFARIN SODIUM 7.5 MILLIGRAM(S): 2.5 TABLET ORAL at 21:19

## 2024-02-29 RX ADMIN — Medication 50 MILLIGRAM(S): at 05:39

## 2024-02-29 RX ADMIN — ATORVASTATIN CALCIUM 80 MILLIGRAM(S): 80 TABLET, FILM COATED ORAL at 21:19

## 2024-02-29 RX ADMIN — PANTOPRAZOLE SODIUM 40 MILLIGRAM(S): 20 TABLET, DELAYED RELEASE ORAL at 05:38

## 2024-02-29 NOTE — PRE-ANESTHESIA EVALUATION ADULT - NSRADCARDRESULTSFT_GEN_ALL_CORE
< from: Stress Echo Exercise (w/TTE, w/Cont) (09.21.22 @ 14:46) >    Echocardiographic Study:  (A) Baseline echocardiogram reveals:  Endocardial visualization enhanced with intravenous  injection of Ultrasonic Enhancing Agent (Definity).  Normal left ventricular internal dimensions with mild  concentric hypertrophy.  Normal left ventricular systolic function. No segmental  wall motion abnormalities.  Bioprosthetic mitral and aortic valves with normal  function.  (B) Stress echocardiogram reveals:  Normal augmentation in left ventricularsystolic function.  Transmitral valve gradient 12.2 mmHg at  97/min.    < end of copied text >

## 2024-02-29 NOTE — PROGRESS NOTE ADULT - ATTENDING COMMENTS
s/p AVR/MVR and prior flutter ablation at OSH here with symptomatic and recurrent flutter, likely atypical. Plan for ablation.
s/p AVR/MVR and prior flutter ablation at OSH here with symptomatic and recurrent flutter, likely atypical. Plan for ablation.

## 2024-02-29 NOTE — PRE-ANESTHESIA EVALUATION ADULT - NSANTHPEFT_GEN_ALL_CORE
GENERAL: NAD  HEAD:  Atraumatic, Normocephalic  CHEST/LUNG: Clear to auscultation bilaterally  HEART: irregular S1/S2  PSYCH: AAOx3  NEUROLOGY: non-focal  SKIN: No obvious rashes or lesions

## 2024-02-29 NOTE — PROGRESS NOTE ADULT - SUBJECTIVE AND OBJECTIVE BOX
ID: 54 yo M with PMH of sev MR and AI s/p bio AVR and bio MVR 9/2015, AFib/AFlutter s/p ablation 1/2018 on AC, DM, HLD, HCV (neg VL), and pheochromocytoma s/p adrenalectomy presenting initially to cardiology clinic, noted to be in a narrow complex tachycardia to 150. Now found to be in AFlutter    Patient seen and examined at bedside.    Overnight Events: Intermittent 2:1 flutter overnight, started on dilt 30 Q6    Telemetry: Aflutter with variable block    Review of Systems: Negative except as per HPI     Current Meds:  acetaminophen     Tablet .. 650 milliGRAM(s) Oral every 6 hours PRN  aspirin enteric coated 81 milliGRAM(s) Oral daily  atorvastatin 80 milliGRAM(s) Oral at bedtime  dextrose 5%. 1000 milliLiter(s) IV Continuous <Continuous>  dextrose 5%. 1000 milliLiter(s) IV Continuous <Continuous>  dextrose 50% Injectable 12.5 Gram(s) IV Push once  dextrose 50% Injectable 25 Gram(s) IV Push once  dextrose 50% Injectable 25 Gram(s) IV Push once  dextrose Oral Gel 15 Gram(s) Oral once PRN  diltiazem    Tablet 30 milliGRAM(s) Oral every 6 hours  glucagon  Injectable 1 milliGRAM(s) IntraMuscular once  influenza   Vaccine 0.5 milliLiter(s) IntraMuscular once  insulin lispro (ADMELOG) corrective regimen sliding scale   SubCutaneous every 6 hours  metoprolol tartrate 50 milliGRAM(s) Oral every 6 hours  pantoprazole    Tablet 40 milliGRAM(s) Oral before breakfast      Vitals:  T(F): 97.5 (02-29), Max: 98.2 (02-28)  HR: 65 (02-29) (65 - 161)  BP: 119/88 (02-29) (102/77 - 122/81)  RR: 18 (02-29)  SpO2: 98% (02-29)  I&O's Summary    29 Feb 2024 07:01  -  29 Feb 2024 09:15  --------------------------------------------------------  IN: 0 mL / OUT: 0 mL / NET: 0 mL        Physical Exam:  Appearance: No acute distress; well appearing  Eyes: PERRL, EOMI, pink conjunctiva  HEENT: Normal oral mucosa  Cardiovascular: RRR, S1, S2, no murmurs, rubs, or gallops; no edema; no JVD  Respiratory: Clear to auscultation bilaterally  Gastrointestinal: soft, non-tender, non-distended with normal bowel sounds  Musculoskeletal: No clubbing; no joint deformity   Neurologic: Non-focal  Lymphatic: No lymphadenopathy  Psychiatry: AAOx3, mood & affect appropriate  Skin: No rashes, ecchymoses, or cyanosis                          11.5   8.30  )-----------( 187      ( 29 Feb 2024 06:31 )             35.4     02-29    136  |  101  |  30<H>  ----------------------------<  137<H>  3.8   |  23  |  1.28    Ca    9.4      29 Feb 2024 06:31  Phos  3.8     02-29  Mg     2.1     02-29    TPro  7.9  /  Alb  4.2  /  TBili  0.6  /  DBili  x   /  AST  35  /  ALT  22  /  AlkPhos  81  02-28    PT/INR - ( 29 Feb 2024 06:31 )   PT: 27.2 sec;   INR: 2.67 ratio         PTT - ( 27 Feb 2024 15:41 )  PTT:33.6 sec  CARDIAC MARKERS ( 27 Feb 2024 16:55 )  35 ng/L / x     / x     / x     / x     / x      CARDIAC MARKERS ( 27 Feb 2024 15:41 )  37 ng/L / x     / x     / x     / x     / x        A/P  54 yo M with PMH of sev MR and AI s/p bio AVR and bio MVR 9/2015, AFib/AFlutter s/p ablation 1/2018 on AC, DM, HLD, HCV (neg VL), and pheochromocytoma s/p adrenalectomy presenting initially to cardiology clinic, noted to be in a narrow complex tachycardia to 150. Now found to be in AFlutter    #Typical AFlutter  - Pt has known hx of AFib/Flutter on coumadin at home      > Pt endorses 100% compliance, INR 2.21 today  - Has hx of ablation but likely OSH as no procedure notes in sunrise or allscipts  - Now rate controlled in 4:1 AFlutter  Plan:  - AF ablation today with Dr. Choe   - Cont home coumadin   - F/U TTE  - Cont diltiazem for now, will reassess post ablation  - Cont tele      Colton Salas PGY5  Cardiology Fellow    HUSSAIN Choe

## 2024-02-29 NOTE — PRE-ANESTHESIA EVALUATION ADULT - NSANTHPMHFT_GEN_ALL_CORE
54 yo M with PMH of sev MR and AI s/p bio AVR and bio MVR 9/2015, AFib/AFlutter s/p ablation 1/2018 on AC, DM, HLD, HCV (neg VL), and pheochromocytoma s/p adrenalectomy presenting initially to cardiology clinic, noted to be in a narrow complex tachycardia to 150. Now found to be in AFlutter

## 2024-02-29 NOTE — PROGRESS NOTE ADULT - SUBJECTIVE AND OBJECTIVE BOX
Andre Reyes, M.D.  Pager: [phone]  Office: [phone]    Patient is a 53y old  Male who presents with a chief complaint of Aflutter (29 Feb 2024 09:15)          SUBJECTIVE / OVERNIGHT EVENTS:    No acute overnight events.    ROS: ( - ) Fever, ( - )Chills,  ( - )Nausea/Vomiting, ( - ) Cough, ( - )Shortness of breath, ( - )Chest Pain    MEDICATIONS  (STANDING):  aspirin enteric coated 81 milliGRAM(s) Oral daily  atorvastatin 80 milliGRAM(s) Oral at bedtime  dextrose 5%. 1000 milliLiter(s) (50 mL/Hr) IV Continuous <Continuous>  dextrose 5%. 1000 milliLiter(s) (100 mL/Hr) IV Continuous <Continuous>  dextrose 50% Injectable 25 Gram(s) IV Push once  dextrose 50% Injectable 12.5 Gram(s) IV Push once  dextrose 50% Injectable 25 Gram(s) IV Push once  diltiazem    Tablet 30 milliGRAM(s) Oral every 6 hours  glucagon  Injectable 1 milliGRAM(s) IntraMuscular once  influenza   Vaccine 0.5 milliLiter(s) IntraMuscular once  insulin lispro (ADMELOG) corrective regimen sliding scale   SubCutaneous every 6 hours  metoprolol tartrate 50 milliGRAM(s) Oral every 6 hours  pantoprazole    Tablet 40 milliGRAM(s) Oral before breakfast    MEDICATIONS  (PRN):  acetaminophen     Tablet .. 650 milliGRAM(s) Oral every 6 hours PRN Temp greater or equal to 38C (100.4F), Mild Pain (1 - 3)  dextrose Oral Gel 15 Gram(s) Oral once PRN Blood Glucose LESS THAN 70 milliGRAM(s)/deciliter          T(C): 36.3 (02-29 @ 09:20), Max: 36.8 (02-28 @ 20:39)   HR: 80   BP: 118/76   RR: 19   SpO2: 96%    PHYSICAL EXAM:    CONSTITUTIONAL: NAD, well-developed, well-groomed  EYES: PERRLA; conjunctiva and sclera clear  ENMT: Moist oral mucosa, no pharyngeal injection or exudates; normal dentition  NECK: Supple, no palpable masses; no thyromegaly  RESPIRATORY: Normal respiratory effort; lungs are clear to auscultation bilaterally  CARDIOVASCULAR: Regular rate and rhythm, normal S1 and S2, no murmur/rub/gallop; No lower extremity edema; Peripheral pulses are 2+ bilaterally  ABDOMEN: Nontender to palpation, normoactive bowel sounds, no rebound/guarding; No hepatosplenomegaly  MUSCULOSKELETAL:  no clubbing or cyanosis of digits; no joint swelling or tenderness to palpation  PSYCH: A+O to person, place, and time; affect appropriate  NEUROLOGY: CN 2-12 are intact and symmetric; no gross sensory deficits   SKIN: No rashes; no palpable lesions      LABS:                        11.5   8.30  )-----------( 187      ( 29 Feb 2024 06:31 )             35.4      02-29    136  |  101  |  30<H>  ----------------------------<  137<H>  3.8   |  23  |  1.28    Ca    9.4      29 Feb 2024 06:31  Phos  3.8     02-29  Mg     2.1     02-29    TPro  7.9  /  Alb  4.2  /  TBili  0.6  /  DBili  x   /  AST  35  /  ALT  22  /  AlkPhos  81  02-28       CAPILLARY BLOOD GLUCOSE      POCT Blood Glucose.: 122 mg/dL (29 Feb 2024 06:19)  POCT Blood Glucose.: 151 mg/dL (29 Feb 2024 00:19)  POCT Blood Glucose.: 188 mg/dL (28 Feb 2024 21:29)  POCT Blood Glucose.: 157 mg/dL (28 Feb 2024 17:59)  POCT Blood Glucose.: 134 mg/dL (28 Feb 2024 11:26)      RADIOLOGY & ADDITIONAL TESTS:    Imaging Personally Reviewed:  Consultant(s) Notes Reviewed:    Care Discussed with Consultants/Other Providers:

## 2024-03-01 ENCOUNTER — TRANSCRIPTION ENCOUNTER (OUTPATIENT)
Age: 54
End: 2024-03-01

## 2024-03-01 LAB
APTT BLD: 33.7 SEC — SIGNIFICANT CHANGE UP (ref 24.5–35.6)
GLUCOSE BLDC GLUCOMTR-MCNC: 174 MG/DL — HIGH (ref 70–99)
GLUCOSE BLDC GLUCOMTR-MCNC: 184 MG/DL — HIGH (ref 70–99)
GLUCOSE BLDC GLUCOMTR-MCNC: 192 MG/DL — HIGH (ref 70–99)
GLUCOSE BLDC GLUCOMTR-MCNC: 256 MG/DL — HIGH (ref 70–99)
HCT VFR BLD CALC: 35 % — LOW (ref 39–50)
HGB BLD-MCNC: 11.3 G/DL — LOW (ref 13–17)
INR BLD: 3.8 RATIO — HIGH (ref 0.85–1.18)
MCHC RBC-ENTMCNC: 28.6 PG — SIGNIFICANT CHANGE UP (ref 27–34)
MCHC RBC-ENTMCNC: 32.3 GM/DL — SIGNIFICANT CHANGE UP (ref 32–36)
MCV RBC AUTO: 88.6 FL — SIGNIFICANT CHANGE UP (ref 80–100)
NRBC # BLD: 0 /100 WBCS — SIGNIFICANT CHANGE UP (ref 0–0)
PLATELET # BLD AUTO: 185 K/UL — SIGNIFICANT CHANGE UP (ref 150–400)
PROTHROM AB SERPL-ACNC: 40.2 SEC — HIGH (ref 9.5–13)
RBC # BLD: 3.95 M/UL — LOW (ref 4.2–5.8)
RBC # FLD: 15.1 % — HIGH (ref 10.3–14.5)
WBC # BLD: 17.19 K/UL — HIGH (ref 3.8–10.5)
WBC # FLD AUTO: 17.19 K/UL — HIGH (ref 3.8–10.5)

## 2024-03-01 PROCEDURE — 99232 SBSQ HOSP IP/OBS MODERATE 35: CPT

## 2024-03-01 RX ORDER — CHLORHEXIDINE GLUCONATE 213 G/1000ML
1 SOLUTION TOPICAL
Refills: 0 | Status: DISCONTINUED | OUTPATIENT
Start: 2024-03-01 | End: 2024-03-03

## 2024-03-01 RX ORDER — WARFARIN SODIUM 2.5 MG/1
3 TABLET ORAL ONCE
Refills: 0 | Status: COMPLETED | OUTPATIENT
Start: 2024-03-01 | End: 2024-03-01

## 2024-03-01 RX ADMIN — Medication 50 MILLIGRAM(S): at 23:18

## 2024-03-01 RX ADMIN — PANTOPRAZOLE SODIUM 40 MILLIGRAM(S): 20 TABLET, DELAYED RELEASE ORAL at 05:16

## 2024-03-01 RX ADMIN — Medication 3: at 12:10

## 2024-03-01 RX ADMIN — Medication 50 MILLIGRAM(S): at 05:16

## 2024-03-01 RX ADMIN — ATORVASTATIN CALCIUM 80 MILLIGRAM(S): 80 TABLET, FILM COATED ORAL at 21:04

## 2024-03-01 RX ADMIN — Medication 81 MILLIGRAM(S): at 12:10

## 2024-03-01 RX ADMIN — Medication 1: at 08:22

## 2024-03-01 RX ADMIN — Medication 50 MILLIGRAM(S): at 17:47

## 2024-03-01 RX ADMIN — Medication 50 MILLIGRAM(S): at 12:10

## 2024-03-01 RX ADMIN — Medication 1: at 17:47

## 2024-03-01 RX ADMIN — WARFARIN SODIUM 3 MILLIGRAM(S): 2.5 TABLET ORAL at 21:21

## 2024-03-01 NOTE — DISCHARGE NOTE PROVIDER - NSDCMRMEDTOKEN_GEN_ALL_CORE_FT
aspirin 81 mg oral delayed release tablet: 1 tab(s) orally once a day  atorvastatin 80 mg oral tablet: 1 tab(s) orally once a day (at bedtime)  chlorthalidone 25 mg oral tablet: 1 tab(s) orally once a day  enalapril 5 mg oral tablet: 1 tab(s) orally 2 times a day  glimepiride 2 mg oral tablet: 1 tab(s) orally once a day  Januvia 100 mg oral tablet: 1 tab(s) orally once a day  metFORMIN 750 mg oral tablet, extended release: 1 tab(s) orally once a day  metoprolol succinate 100 mg oral tablet, extended release: 1 tab(s) orally once a day  pantoprazole 40 mg oral delayed release tablet: 1 tab(s) orally once a day  pioglitazone 45 mg oral tablet: 1 tab(s) orally once a day  warfarin 7.5 mg oral tablet: 1 tab(s) orally once a day   aspirin 81 mg oral delayed release tablet: 1 tab(s) orally once a day  atorvastatin 80 mg oral tablet: 1 tab(s) orally once a day (at bedtime)  chlorthalidone 25 mg oral tablet: 1 tab(s) orally once a day  enalapril 5 mg oral tablet: 1 tab(s) orally 2 times a day  glimepiride 2 mg oral tablet: 1 tab(s) orally once a day  Januvia 100 mg oral tablet: 1 tab(s) orally once a day  metFORMIN 750 mg oral tablet, extended release: 1 tab(s) orally once a day  metoprolol succinate 100 mg oral tablet, extended release: 1 tab(s) orally once a day  pantoprazole 40 mg oral delayed release tablet: 1 tab(s) orally once a day  pioglitazone 45 mg oral tablet: 1 tab(s) orally once a day  warfarin 5 mg oral tablet: 1 tab(s) orally once shceduled appt. INR Tuesday 3/4/24; PMD to determine Coumadin dose @ visit   aspirin 81 mg oral delayed release tablet: 1 tab(s) orally once a day  atorvastatin 80 mg oral tablet: 1 tab(s) orally once a day (at bedtime)  glimepiride 2 mg oral tablet: 1 tab(s) orally once a day  Januvia 100 mg oral tablet: 1 tab(s) orally once a day  metFORMIN 750 mg oral tablet, extended release: 1 tab(s) orally once a day  metoprolol succinate 100 mg oral tablet, extended release: 1 tab(s) orally once a day  pantoprazole 40 mg oral delayed release tablet: 1 tab(s) orally once a day  pioglitazone 45 mg oral tablet: 1 tab(s) orally once a day  warfarin 5 mg oral tablet: 1 tab(s) orally once shceduled appt. INR Tuesday 3/4/24; PMD to determine Coumadin dose @ visit; per Dr. Ramirez

## 2024-03-01 NOTE — DISCHARGE NOTE PROVIDER - HOSPITAL COURSE
HPI:  Pt is a 52yo M w/ PMH of HTN, HLD, DM2, CAD, Afib, pheo s/p right adrenalectomy pw narrow complex tachycardia to 150s at cardiology clinic.     He denies any symptoms, reports was routine f/u though did experience some exertional dyspnea over the past week. In the ED he was noted to have 2:1 atypical flutter s/p adenosine w/out improvement. He was also administered dilt IV and started on gtt.     His other vitals were otherwise stable and labs notable for Cr 2.02 w/ baseline ~1.  (27 Feb 2024 23:30)    Hospital Course:  Pt noted to be in a narrow complex tachycardia to 150. Then found to be in AFlutter. S/p successful atrial flutter ablation 2/29.    Important Medication Changes and Reason:  none    Active or Pending Issues Requiring Follow-up:  - Follow up appointment 4/10 @ 12:15 pm with Dr. Gavin    Advanced Directives:   [ x] Full code  [ ] DNR  [ ] Hospice    Discharge Diagnoses:  Atypical Aflutter  DEEPTHI  Tachycardia

## 2024-03-01 NOTE — DISCHARGE NOTE PROVIDER - NSDCCPCAREPLAN_GEN_ALL_CORE_FT
PRINCIPAL DISCHARGE DIAGNOSIS  Diagnosis: Atypical atrial flutter  Assessment and Plan of Treatment: You underwent an ablatation. Please follow up with Dr. Gavin. Your appointment is on 4/10 @ 12:15 pm      SECONDARY DISCHARGE DIAGNOSES  Diagnosis: Type 2 diabetes mellitus  Assessment and Plan of Treatment: Please follow up with your Primary Care Physcian for continued management of your Diabetes.  In the hospital your A1C was noted to be 6.5.       Diagnosis: HTN (hypertension)  Assessment and Plan of Treatment: Please follow up with your Primary Care Physician within 7 days after discharge for continued management of this chronic medical issue      Diagnosis: DEEPTHI (acute kidney injury)  Assessment and Plan of Treatment: Your kidney function was reduced do to you fast heart rate. It is improved now. This is your creatinine trend.  Creatinine Trend: 1.28<--, 1.41<--, 1.52<--, 2.02<--     PRINCIPAL DISCHARGE DIAGNOSIS  Diagnosis: Atypical atrial flutter  Assessment and Plan of Treatment: You underwent an ablatation. Please follow up with Dr. Gavin. Your appointment is on 4/10 @ 12:15 pm  followup with PMD Dr. Gutierrez 1-3 days for repeat INR to determine continued dose of Coumadin.  Per Dr. NADER Ramirez(inpatient) Coumadin 5mg oral daily until re-evaluated in office  Atrial fibrillation is the most common heart rhythm problem.  The condition puts you at risk for has stroke and heart attack  It helps if you control your blood pressure, not drink more than 1-2 alcohol drinks per day, cut down on caffeine, getting treatment for over active thyroid gland, and get regular exercise  Call your doctor if you feel your heart racing or beating unusually, chest tightness or pain, lightheaded, faint, shortness of breath especially with exercise  It is important to take your heart medication as prescribed  You may be on anticoagulation which is very important to take as directed - you may need blood work to monitor drug levels        SECONDARY DISCHARGE DIAGNOSES  Diagnosis: Type 2 diabetes mellitus  Assessment and Plan of Treatment: Please follow up with your Primary Care Physcian for continued management of your Diabetes.  In the hospital your A1C was noted to be 6.5.       Diagnosis: HTN (hypertension)  Assessment and Plan of Treatment: Please follow up with your Primary Care Physician within 7 days after discharge for continued management of this chronic medical issue      Diagnosis: DEEPTHI (acute kidney injury)  Assessment and Plan of Treatment: Your kidney function was reduced do to you fast heart rate. It is improved now. This is your creatinine trend.  Creatinine Trend: 1.28<--, 1.41<--, 1.52<--, 2.02<--

## 2024-03-01 NOTE — DISCHARGE NOTE PROVIDER - PROVIDER TOKENS
PROVIDER:[TOKEN:[[id number]],FOLLOWUP:[1 month],ESTABLISHEDPATIENT:[T]],PROVIDER:[TOKEN:[[id number]],FOLLOWUP:[1 week],ESTABLISHEDPATIENT:[T]] PROVIDER:[TOKEN:[[id number]],FOLLOWUP:[1 month],ESTABLISHEDPATIENT:[T]],PROVIDER:[TOKEN:[[id number]],FOLLOWUP:[1-3 days],ESTABLISHEDPATIENT:[T]]

## 2024-03-01 NOTE — DISCHARGE NOTE PROVIDER - NSDCFUADDINST_GEN_ALL_CORE_FT
Please do not restart your coumadin until March 3rd then resume your normal dosing and follow up with primary care physician for adjustments

## 2024-03-01 NOTE — DISCHARGE NOTE PROVIDER - ATTENDING DISCHARGE PHYSICAL EXAMINATION:
T(C): 36.8 (03-01 @ 11:23), Max: 36.9 (02-29 @ 20:56)   HR: 75   BP: 123/81   RR: 18   SpO2: 96%    PHYSICAL EXAM:    CONSTITUTIONAL: NAD, well-developed, well-groomed  EYES: PERRLA; conjunctiva and sclera clear  ENMT: Moist oral mucosa, no pharyngeal injection or exudates; normal dentition  NECK: Supple, no palpable masses; no thyromegaly  RESPIRATORY: Normal respiratory effort; lungs are clear to auscultation bilaterally  CARDIOVASCULAR: Regular rate and rhythm, normal S1 and S2, no murmur/rub/gallop; No lower extremity edema; Peripheral pulses are 2+ bilaterally  ABDOMEN: Nontender to palpation, normoactive bowel sounds, no rebound/guarding; No hepatosplenomegaly  MUSCULOSKELETAL:  no clubbing or cyanosis of digits; no joint swelling or tenderness to palpation  PSYCH: A+O to person, place, and time; affect appropriate  NEUROLOGY: CN 2-12 are intact and symmetric; no gross sensory deficits   SKIN: No rashes; no palpable lesions

## 2024-03-01 NOTE — PROGRESS NOTE ADULT - ASSESSMENT
52 yo M with PMH of sev MR and AI s/p bio AVR and bio MVR 9/2015, AFib/AFlutter s/p ablation 1/2018 on AC, DM, HLD, HCV (neg VL), and pheochromocytoma s/p adrenalectomy presenting initially to cardiology clinic, noted to be in a narrow complex tachycardia to 150. Now found to be in AFlutter.    1. Atrial Flutter    - S/p successful atrial flutter ablation 2/29  - Post-procedure education provided to patient and family  - Continue AC  - Continue beta blocker  - Follow up appointment 4/10 @ 12:15 pm  - Cleared for discharge from EP perspective.    Olive Mae PA-C  TEAMS 54 yo M with PMH of sev MR and AI s/p bio AVR and bio MVR 9/2015, AFib/AFlutter s/p ablation 1/2018 on AC, DM, HLD, HCV (neg VL), and pheochromocytoma s/p adrenalectomy presenting initially to cardiology clinic, noted to be in a narrow complex tachycardia to 150. Now found to be in AFlutter.    1. Atrial Flutter    - S/p successful atrial flutter ablation 2/29  - Post-procedure education provided to patient and family  - Continue AC  - Continue beta blocker  - Follow up appointment 4/10 @ 12:15 pm  - Pending PT/INR results    Olive Mae PA-C  TEAMS 54 yo M with PMH of sev MR and AI s/p bio AVR and bio MVR 9/2015, AFib/AFlutter s/p ablation 1/2018 on AC, DM, HLD, HCV (neg VL), and pheochromocytoma s/p adrenalectomy presenting initially to cardiology clinic, noted to be in a narrow complex tachycardia to 150. Now found to be in AFlutter.    1. Atrial Flutter    - S/p successful atrial flutter ablation 2/29  - Post-procedure education provided to patient and family  - Continue AC  - Continue beta blocker  - Follow up appointment 4/10 @ 12:15 pm  - Pending PT/INR results    ADDENDUM:  - INR of 3.8 today, decrease Coumadin dose by half and monitor overnight for bleeding    Olive Mae PA-C  TEAMS

## 2024-03-01 NOTE — DISCHARGE NOTE PROVIDER - CARE PROVIDER_API CALL
Marcell Choe  Cardiac Electrophysiology  [address] [place] [id number]  Phone: [phone]  Fax: [phone]  Established Patient  Follow Up Time: 1 month    WARNER CRISTOBAL  [address] [place] [address]  Phone: ()-  Fax: ()-  Established Patient  Follow Up Time: 1 week   Marcell Choe  Cardiac Electrophysiology  [address] [place] [id number]  Phone: [phone]  Fax: [phone]  Established Patient  Follow Up Time: 1 month    WARNER CRISTOBAL  [address] [place] [address]  Phone: ()-  Fax: ()-  Established Patient  Follow Up Time: 1-3 days

## 2024-03-01 NOTE — DISCHARGE NOTE NURSING/CASE MANAGEMENT/SOCIAL WORK - PATIENT PORTAL LINK FT
You can access the FollowMyHealth Patient Portal offered by [facility] by registering at the following website: [email or web address]. By joining [employer] [facility]’s FollowMyHealth portal, you will also be able to view your health information using other applications (apps) compatible with our system.

## 2024-03-01 NOTE — PROGRESS NOTE ADULT - SUBJECTIVE AND OBJECTIVE BOX
Andre Reyes, M.D.  Pager: [phone]  Office: [phone]    Patient is a 53y old  Male who presents with a chief complaint of Aflutter (29 Feb 2024 09:15)          SUBJECTIVE / OVERNIGHT EVENTS:    No acute overnight events.    ROS: ( - ) Fever, ( - )Chills,  ( - )Nausea/Vomiting, ( - ) Cough, ( - )Shortness of breath, ( - )Chest Pain    MEDICATIONS  (STANDING):  aspirin enteric coated 81 milliGRAM(s) Oral daily  atorvastatin 80 milliGRAM(s) Oral at bedtime  dextrose 5%. 1000 milliLiter(s) (50 mL/Hr) IV Continuous <Continuous>  dextrose 5%. 1000 milliLiter(s) (100 mL/Hr) IV Continuous <Continuous>  dextrose 50% Injectable 12.5 Gram(s) IV Push once  dextrose 50% Injectable 25 Gram(s) IV Push once  dextrose 50% Injectable 25 Gram(s) IV Push once  diltiazem    Tablet 30 milliGRAM(s) Oral every 6 hours  glucagon  Injectable 1 milliGRAM(s) IntraMuscular once  influenza   Vaccine 0.5 milliLiter(s) IntraMuscular once  insulin lispro (ADMELOG) corrective regimen sliding scale   SubCutaneous at bedtime  insulin lispro (ADMELOG) corrective regimen sliding scale   SubCutaneous three times a day before meals  metoprolol tartrate 50 milliGRAM(s) Oral every 6 hours  pantoprazole    Tablet 40 milliGRAM(s) Oral before breakfast    MEDICATIONS  (PRN):  acetaminophen     Tablet .. 650 milliGRAM(s) Oral every 6 hours PRN Temp greater or equal to 38C (100.4F), Mild Pain (1 - 3)  dextrose Oral Gel 15 Gram(s) Oral once PRN Blood Glucose LESS THAN 70 milliGRAM(s)/deciliter          T(C): 36.6 (03-01 @ 04:28), Max: 36.9 (02-29 @ 20:56)   HR: 80   BP: 125/82   RR: 17   SpO2: 93%    PHYSICAL EXAM:    CONSTITUTIONAL: NAD, well-developed, well-groomed  EYES: PERRLA; conjunctiva and sclera clear  ENMT: Moist oral mucosa, no pharyngeal injection or exudates; normal dentition  NECK: Supple, no palpable masses; no thyromegaly  RESPIRATORY: Normal respiratory effort; lungs are clear to auscultation bilaterally  CARDIOVASCULAR: Regular rate and rhythm, normal S1 and S2, no murmur/rub/gallop; No lower extremity edema; Peripheral pulses are 2+ bilaterally  ABDOMEN: Nontender to palpation, normoactive bowel sounds, no rebound/guarding; No hepatosplenomegaly  MUSCULOSKELETAL:  no clubbing or cyanosis of digits; no joint swelling or tenderness to palpation  PSYCH: A+O to person, place, and time; affect appropriate  NEUROLOGY: CN 2-12 are intact and symmetric; no gross sensory deficits   SKIN: No rashes; no palpable lesions      LABS:                        11.5   8.30  )-----------( 187      ( 29 Feb 2024 06:31 )             35.4      02-29    136  |  101  |  30<H>  ----------------------------<  137<H>  3.8   |  23  |  1.28    Ca    9.4      29 Feb 2024 06:31  Phos  3.8     02-29  Mg     2.1     02-29         CAPILLARY BLOOD GLUCOSE      POCT Blood Glucose.: 174 mg/dL (01 Mar 2024 08:03)  POCT Blood Glucose.: 217 mg/dL (29 Feb 2024 21:11)  POCT Blood Glucose.: 161 mg/dL (29 Feb 2024 17:41)  POCT Blood Glucose.: 150 mg/dL (29 Feb 2024 16:03)      RADIOLOGY & ADDITIONAL TESTS:    Imaging Personally Reviewed:  Consultant(s) Notes Reviewed:    Care Discussed with Consultants/Other Providers:

## 2024-03-01 NOTE — PROGRESS NOTE ADULT - SUBJECTIVE AND OBJECTIVE BOX
24H hour events: S/p atrial flutter ablation 2/29    MEDICATIONS:  aspirin enteric coated 81 milliGRAM(s) Oral daily  diltiazem    Tablet 30 milliGRAM(s) Oral every 6 hours  metoprolol tartrate 50 milliGRAM(s) Oral every 6 hours  acetaminophen     Tablet .. 650 milliGRAM(s) Oral every 6 hours PRN  pantoprazole    Tablet 40 milliGRAM(s) Oral before breakfast  atorvastatin 80 milliGRAM(s) Oral at bedtime  dextrose 50% Injectable 12.5 Gram(s) IV Push once  dextrose 50% Injectable 25 Gram(s) IV Push once  dextrose 50% Injectable 25 Gram(s) IV Push once  dextrose Oral Gel 15 Gram(s) Oral once PRN  glucagon  Injectable 1 milliGRAM(s) IntraMuscular once  insulin lispro (ADMELOG) corrective regimen sliding scale   SubCutaneous three times a day before meals  insulin lispro (ADMELOG) corrective regimen sliding scale   SubCutaneous at bedtime  dextrose 5%. 1000 milliLiter(s) IV Continuous <Continuous>  dextrose 5%. 1000 milliLiter(s) IV Continuous <Continuous>  influenza   Vaccine 0.5 milliLiter(s) IntraMuscular once      REVIEW OF SYSTEMS:  See HPI, otherwise ROS negative.    PHYSICAL EXAM:  T(C): 36.6 (03-01-24 @ 04:28), Max: 36.9 (02-29-24 @ 20:56)  HR: 80 (03-01-24 @ 04:28) (72 - 80)  BP: 125/82 (03-01-24 @ 04:28) (112/75 - 137/87)  RR: 17 (03-01-24 @ 04:28) (16 - 18)  SpO2: 93% (03-01-24 @ 04:28) (93% - 98%)  Wt(kg): --  I&O's Summary    29 Feb 2024 07:01  -  01 Mar 2024 07:00  --------------------------------------------------------  IN: 0 mL / OUT: 1 mL / NET: -1 mL        Appearance: Alert. NAD	  Cardiovascular: +S1S2 RRR no m/g/r  Respiratory: CTA B/L	  Psychiatry: A & O x 3, Mood & affect appropriate	  Skin: R groin flat without erythema  Neurologic: Non-focal  Extremities: No edema BLE  Vascular: Peripheral pulses palpable 2+ bilaterally      LABS:	 	    CBC Full  -  ( 29 Feb 2024 06:31 )  WBC Count : 8.30 K/uL  Hemoglobin : 11.5 g/dL  Hematocrit : 35.4 %  Platelet Count - Automated : 187 K/uL  Mean Cell Volume : 87.4 fl  Mean Cell Hemoglobin : 28.4 pg  Mean Cell Hemoglobin Concentration : 32.5 gm/dL  Auto Neutrophil # : x  Auto Lymphocyte # : x  Auto Monocyte # : x  Auto Eosinophil # : x  Auto Basophil # : x  Auto Neutrophil % : x  Auto Lymphocyte % : x  Auto Monocyte % : x  Auto Eosinophil % : x  Auto Basophil % : x    02-29    136  |  101  |  30<H>  ----------------------------<  137<H>  3.8   |  23  |  1.28  02-29    136  |  103  |  34<H>  ----------------------------<  150<H>  4.5   |  21<L>  |  1.41<H>    Ca    9.4      29 Feb 2024 06:31  Ca    9.8      29 Feb 2024 00:02  Phos  3.8     02-29  Mg     2.1     02-29    TELEMETRY: SR  	    ECG:  SR @ 78 BPM

## 2024-03-01 NOTE — DISCHARGE NOTE PROVIDER - NSDCFUADDAPPT_GEN_ALL_CORE_FT
APPTS ARE READY TO BE MADE: [x ] YES    Best Family or Patient Contact (if needed):    Additional Information about above appointments (if needed):    1:   2:   3:     Other comments or requests:    APPTS ARE READY TO BE MADE: [x ] YES    Best Family or Patient Contact (if needed):    Additional Information about above appointments (if needed):    1:   2:   3:     Other comments or requests:     Patient refused to provide their date-of-birth; unable to proceed with referral information. APPTS ARE READY TO BE MADE: [x ] YES    Best Family or Patient Contact (if needed):    Additional Information about above appointments (if needed):    1:   2:   3:     Other comments or requests:     Patient refused to provide their date-of-birth; unable to proceed with referral information.    Prior to outreaching the patient, it was visible that the patient has secured a follow up appointment which was not scheduled by our team. Patient is scheduled with Dr. Choe 4/10/24 12:15pm [address] [place] [address]

## 2024-03-02 LAB
APTT BLD: 33.8 SEC — SIGNIFICANT CHANGE UP (ref 24.5–35.6)
GLUCOSE BLDC GLUCOMTR-MCNC: 148 MG/DL — HIGH (ref 70–99)
GLUCOSE BLDC GLUCOMTR-MCNC: 175 MG/DL — HIGH (ref 70–99)
GLUCOSE BLDC GLUCOMTR-MCNC: 187 MG/DL — HIGH (ref 70–99)
GLUCOSE BLDC GLUCOMTR-MCNC: 229 MG/DL — HIGH (ref 70–99)
HCT VFR BLD CALC: 35.3 % — LOW (ref 39–50)
HGB BLD-MCNC: 11.2 G/DL — LOW (ref 13–17)
INR BLD: 4.87 RATIO — HIGH (ref 0.85–1.18)
MCHC RBC-ENTMCNC: 28.4 PG — SIGNIFICANT CHANGE UP (ref 27–34)
MCHC RBC-ENTMCNC: 31.7 GM/DL — LOW (ref 32–36)
MCV RBC AUTO: 89.6 FL — SIGNIFICANT CHANGE UP (ref 80–100)
MRSA PCR RESULT.: DETECTED
NRBC # BLD: 0 /100 WBCS — SIGNIFICANT CHANGE UP (ref 0–0)
PLATELET # BLD AUTO: 167 K/UL — SIGNIFICANT CHANGE UP (ref 150–400)
PROTHROM AB SERPL-ACNC: 51.2 SEC — HIGH (ref 9.5–13)
RBC # BLD: 3.94 M/UL — LOW (ref 4.2–5.8)
RBC # FLD: 15.1 % — HIGH (ref 10.3–14.5)
S AUREUS DNA NOSE QL NAA+PROBE: DETECTED
WBC # BLD: 14.94 K/UL — HIGH (ref 3.8–10.5)
WBC # FLD AUTO: 14.94 K/UL — HIGH (ref 3.8–10.5)

## 2024-03-02 PROCEDURE — 99232 SBSQ HOSP IP/OBS MODERATE 35: CPT

## 2024-03-02 RX ORDER — WARFARIN SODIUM 2.5 MG/1
3 TABLET ORAL ONCE
Refills: 0 | Status: COMPLETED | OUTPATIENT
Start: 2024-03-02 | End: 2024-03-02

## 2024-03-02 RX ORDER — MUPIROCIN 20 MG/G
1 OINTMENT TOPICAL
Refills: 0 | Status: DISCONTINUED | OUTPATIENT
Start: 2024-03-02 | End: 2024-03-03

## 2024-03-02 RX ADMIN — WARFARIN SODIUM 3 MILLIGRAM(S): 2.5 TABLET ORAL at 21:11

## 2024-03-02 RX ADMIN — Medication 1: at 11:40

## 2024-03-02 RX ADMIN — ATORVASTATIN CALCIUM 80 MILLIGRAM(S): 80 TABLET, FILM COATED ORAL at 21:11

## 2024-03-02 RX ADMIN — Medication 50 MILLIGRAM(S): at 11:37

## 2024-03-02 RX ADMIN — Medication 50 MILLIGRAM(S): at 17:20

## 2024-03-02 RX ADMIN — Medication 2: at 17:19

## 2024-03-02 RX ADMIN — Medication 50 MILLIGRAM(S): at 23:20

## 2024-03-02 RX ADMIN — PANTOPRAZOLE SODIUM 40 MILLIGRAM(S): 20 TABLET, DELAYED RELEASE ORAL at 05:32

## 2024-03-02 RX ADMIN — Medication 81 MILLIGRAM(S): at 11:37

## 2024-03-02 RX ADMIN — CHLORHEXIDINE GLUCONATE 1 APPLICATION(S): 213 SOLUTION TOPICAL at 05:32

## 2024-03-02 RX ADMIN — Medication 50 MILLIGRAM(S): at 05:32

## 2024-03-02 NOTE — PROGRESS NOTE ADULT - SUBJECTIVE AND OBJECTIVE BOX
24H hour events:     MEDICATIONS:  aspirin enteric coated 81 milliGRAM(s) Oral daily  metoprolol tartrate 50 milliGRAM(s) Oral every 6 hours        acetaminophen     Tablet .. 650 milliGRAM(s) Oral every 6 hours PRN    pantoprazole    Tablet 40 milliGRAM(s) Oral before breakfast    atorvastatin 80 milliGRAM(s) Oral at bedtime  dextrose 50% Injectable 12.5 Gram(s) IV Push once  dextrose 50% Injectable 25 Gram(s) IV Push once  dextrose 50% Injectable 25 Gram(s) IV Push once  dextrose Oral Gel 15 Gram(s) Oral once PRN  glucagon  Injectable 1 milliGRAM(s) IntraMuscular once  insulin lispro (ADMELOG) corrective regimen sliding scale   SubCutaneous three times a day before meals  insulin lispro (ADMELOG) corrective regimen sliding scale   SubCutaneous at bedtime    chlorhexidine 2% Cloths 1 Application(s) Topical <User Schedule>  dextrose 5%. 1000 milliLiter(s) IV Continuous <Continuous>  dextrose 5%. 1000 milliLiter(s) IV Continuous <Continuous>  influenza   Vaccine 0.5 milliLiter(s) IntraMuscular once      REVIEW OF SYSTEMS:  Complete 12 point ROS negative.    PHYSICAL EXAM:  T(C): 36.4 (03-02-24 @ 04:36), Max: 36.8 (03-01-24 @ 11:23)  HR: 66 (03-02-24 @ 04:36) (66 - 82)  BP: 155/93 (03-02-24 @ 04:36) (123/81 - 163/97)  RR: 18 (03-02-24 @ 04:36) (18 - 18)  SpO2: 95% (03-02-24 @ 04:36) (94% - 98%)  Wt(kg): --  I&O's Summary    01 Mar 2024 07:01  -  02 Mar 2024 07:00  --------------------------------------------------------  IN: 810 mL / OUT: 0 mL / NET: 810 mL        Appearance: Normal	  HEENT: PERRL, EOMI	  Cardiovascular: Normal S1 S2, No JVD, No murmurs  Respiratory: Lungs clear to auscultation	  Psychiatry: A & O x 3, Mood & affect appropriate  Gastrointestinal:  Soft, Non-tender, + BS	  Skin: No rashes, No ecchymoses, No cyanosis	  Neurologic: Grossly intact  Extremities: No clubbing, cyanosis or edema  Vascular: Peripheral pulses palpable 2+ bilaterally        LABS:	 	    CBC Full  -  ( 02 Mar 2024 06:22 )  WBC Count : 14.94 K/uL  Hemoglobin : 11.2 g/dL  Hematocrit : 35.3 %  Platelet Count - Automated : 167 K/uL  Mean Cell Volume : 89.6 fl  Mean Cell Hemoglobin : 28.4 pg  Mean Cell Hemoglobin Concentration : 31.7 gm/dL  Auto Neutrophil # : x  Auto Lymphocyte # : x  Auto Monocyte # : x  Auto Eosinophil # : x  Auto Basophil # : x  Auto Neutrophil % : x  Auto Lymphocyte % : x  Auto Monocyte % : x  Auto Eosinophil % : x  Auto Basophil % : x            proBNP:   Lipid Profile:   HgA1c:   TSH:       CARDIAC MARKERS:          TELEMETRY: 	    ECG:  	  RADIOLOGY:  OTHER: 	    PREVIOUS DIAGNOSTIC TESTING:    [ ] Echocardiogram:    [ ]  Catheterization:  [ ] Stress Test:  	  	  ASSESSMENT/PLAN: 	     24H hour events: Pt without complaint, no acute events overnight, Tele: SR in the 60's, rare trigeminy     MEDICATIONS:  aspirin enteric coated 81 milliGRAM(s) Oral daily  metoprolol tartrate 50 milliGRAM(s) Oral every 6 hours  acetaminophen     Tablet .. 650 milliGRAM(s) Oral every 6 hours PRN  pantoprazole    Tablet 40 milliGRAM(s) Oral before breakfast  atorvastatin 80 milliGRAM(s) Oral at bedtime  dextrose 50% Injectable 12.5 Gram(s) IV Push once  dextrose 50% Injectable 25 Gram(s) IV Push once  dextrose 50% Injectable 25 Gram(s) IV Push once  dextrose Oral Gel 15 Gram(s) Oral once PRN  glucagon  Injectable 1 milliGRAM(s) IntraMuscular once  insulin lispro (ADMELOG) corrective regimen sliding scale   SubCutaneous three times a day before meals  insulin lispro (ADMELOG) corrective regimen sliding scale   SubCutaneous at bedtime  chlorhexidine 2% Cloths 1 Application(s) Topical <User Schedule>  dextrose 5%. 1000 milliLiter(s) IV Continuous <Continuous>  dextrose 5%. 1000 milliLiter(s) IV Continuous <Continuous>  influenza   Vaccine 0.5 milliLiter(s) IntraMuscular once      REVIEW OF SYSTEMS:  Complete 12 point ROS negative.    PHYSICAL EXAM:  T(C): 36.4 (03-02-24 @ 04:36), Max: 36.8 (03-01-24 @ 11:23)  HR: 66 (03-02-24 @ 04:36) (66 - 82)  BP: 155/93 (03-02-24 @ 04:36) (123/81 - 163/97)  RR: 18 (03-02-24 @ 04:36) (18 - 18)  SpO2: 95% (03-02-24 @ 04:36) (94% - 98%)  Wt(kg): --  I&O's Summary    01 Mar 2024 07:01  -  02 Mar 2024 07:00  --------------------------------------------------------  IN: 810 mL / OUT: 0 mL / NET: 810 mL        Appearance: Normal	  HEENT: PERRL, EOMI	  Cardiovascular: Normal S1 S2, No JVD, No murmurs  Respiratory: Lungs clear to auscultation	  Psychiatry: A & O x 3, Mood & affect appropriate  Gastrointestinal: Soft, Non-tender, + BS	  Skin: No rashes, No ecchymoses, No cyanosis	  Neurologic: Grossly intact  Extremities: No clubbing, cyanosis or edema. Right groin soft and non-tender   Vascular: Peripheral pulses palpable 2+ bilaterally        LABS:	 	    CBC Full  -  ( 02 Mar 2024 06:22 )  WBC Count : 14.94 K/uL  Hemoglobin : 11.2 g/dL  Hematocrit : 35.3 %  Platelet Count - Automated : 167 K/uL  Mean Cell Volume : 89.6 fl  Mean Cell Hemoglobin : 28.4 pg  Mean Cell Hemoglobin Concentration : 31.7 gm/dL  Auto Neutrophil # : x  Auto Lymphocyte # : x  Auto Monocyte # : x  Auto Eosinophil # : x  Auto Basophil # : x  Auto Neutrophil % : x  Auto Lymphocyte % : x  Auto Monocyte % : x  Auto Eosinophil % : x  Auto Basophil % : x      TELEMETRY: SR in the 60's, rare trigeminy

## 2024-03-02 NOTE — PROGRESS NOTE ADULT - ASSESSMENT
54 yo M with PMH of sev MR and AI s/p bio AVR and bio MVR 9/2015, AFib/AFlutter s/p ablation 1/2018 on AC, DM, HLD, HCV (neg VL), and pheochromocytoma s/p adrenalectomy presenting initially to cardiology clinic, noted to be in atrial flutter with  bpm. Now s/p successful atrial flutter ablation 2/29.     1. Atrial Flutter    - INR 4.87 today, please dose coumadin 3mg tonight. Check INR in am.   - Continue beta blocker  - Follow up appointment 4/10 @ 12:15 pm  - Possible discharge in am if INR downtrending   - Discussed with Medicine ACP.     KATIE Mccallum NP-C  [phone]

## 2024-03-02 NOTE — PROGRESS NOTE ADULT - SUBJECTIVE AND OBJECTIVE BOX
Priyanka Ramirez MD  Division of Hospital Medicine  Please contact via MS Teams (prefer message first)  Office: [phone]    Patient is a 53y old  Male who presents with a chief complaint of Aflutter (02 Mar 2024 07:35)      SUBJECTIVE / OVERNIGHT EVENTS:  no acute events overnight, vss, afebrile  pt denies any bleeding/oozing at the groin site  feels well    ROS:  14 point ROS negative in detail except stated as above    MEDICATIONS  (STANDING):  aspirin enteric coated 81 milliGRAM(s) Oral daily  atorvastatin 80 milliGRAM(s) Oral at bedtime  chlorhexidine 2% Cloths 1 Application(s) Topical <User Schedule>  dextrose 5%. 1000 milliLiter(s) (100 mL/Hr) IV Continuous <Continuous>  dextrose 5%. 1000 milliLiter(s) (50 mL/Hr) IV Continuous <Continuous>  dextrose 50% Injectable 12.5 Gram(s) IV Push once  dextrose 50% Injectable 25 Gram(s) IV Push once  dextrose 50% Injectable 25 Gram(s) IV Push once  glucagon  Injectable 1 milliGRAM(s) IntraMuscular once  influenza   Vaccine 0.5 milliLiter(s) IntraMuscular once  insulin lispro (ADMELOG) corrective regimen sliding scale   SubCutaneous three times a day before meals  insulin lispro (ADMELOG) corrective regimen sliding scale   SubCutaneous at bedtime  metoprolol tartrate 50 milliGRAM(s) Oral every 6 hours  pantoprazole    Tablet 40 milliGRAM(s) Oral before breakfast  warfarin 3 milliGRAM(s) Oral once    MEDICATIONS  (PRN):  acetaminophen     Tablet .. 650 milliGRAM(s) Oral every 6 hours PRN Temp greater or equal to 38C (100.4F), Mild Pain (1 - 3)  dextrose Oral Gel 15 Gram(s) Oral once PRN Blood Glucose LESS THAN 70 milliGRAM(s)/deciliter      CAPILLARY BLOOD GLUCOSE      POCT Blood Glucose.: 175 mg/dL (02 Mar 2024 11:39)  POCT Blood Glucose.: 148 mg/dL (02 Mar 2024 07:35)  POCT Blood Glucose.: 192 mg/dL (01 Mar 2024 21:38)  POCT Blood Glucose.: 184 mg/dL (01 Mar 2024 17:31)  POCT Blood Glucose.: 256 mg/dL (01 Mar 2024 11:50)    I&O's Summary    01 Mar 2024 07:01  -  02 Mar 2024 07:00  --------------------------------------------------------  IN: 810 mL / OUT: 0 mL / NET: 810 mL    02 Mar 2024 07:01  -  02 Mar 2024 11:43  --------------------------------------------------------  IN: 280 mL / OUT: 0 mL / NET: 280 mL        PHYSICAL EXAM:  Vital Signs Last 24 Hrs  T(C): 36.6 (02 Mar 2024 11:21), Max: 36.7 (01 Mar 2024 16:23)  T(F): 97.9 (02 Mar 2024 11:21), Max: 98 (01 Mar 2024 16:23)  HR: 61 (02 Mar 2024 11:21) (61 - 82)  BP: 153/81 (02 Mar 2024 11:21) (138/84 - 163/97)  BP(mean): --  RR: 18 (02 Mar 2024 11:21) (18 - 18)  SpO2: 98% (02 Mar 2024 11:21) (94% - 98%)    Parameters below as of 02 Mar 2024 11:21  Patient On (Oxygen Delivery Method): room air      GENERAL: NAD, well-developed  HEAD:  Atraumatic, Normocephalic  EYES: EOMI, PERRLA, conjunctiva and sclera clear  NECK: Supple, No JVD  CHEST/LUNG: Clear to auscultation bilaterally; No wheeze  HEART: Regular rate and rhythm; No murmurs, rubs, or gallops  ABDOMEN: Soft, Nontender, Nondistended; Bowel sounds present  EXTREMITIES:  no hematoma on R groin; 2+ Peripheral Pulses, No clubbing, cyanosis, or edema  NEUROLOGY: AAOx3; non-focal  SKIN: No rashes or lesions    LABS:                        11.2   14.94 )-----------( 167      ( 02 Mar 2024 06:22 )             35.3           PT/INR - ( 02 Mar 2024 06:22 )   PT: 51.2 sec;   INR: 4.87 ratio         PTT - ( 02 Mar 2024 06:22 )  PTT:33.8 sec          RADIOLOGY & ADDITIONAL TESTS:    Imaging Personally Reviewed:    Consultant(s) Notes Reviewed:  EP    Care Discussed with Consultants/Other Providers:

## 2024-03-02 NOTE — PROGRESS NOTE ADULT - ASSESSMENT
53M hx HTN, HLD, DM type 2, CAD, AF, pheochromocytoma s/p right adrenalectomy p/w rapid atrial flutter s/p ablation

## 2024-03-03 ENCOUNTER — NON-APPOINTMENT (OUTPATIENT)
Age: 54
End: 2024-03-03

## 2024-03-03 VITALS
OXYGEN SATURATION: 98 % | RESPIRATION RATE: 18 BRPM | DIASTOLIC BLOOD PRESSURE: 92 MMHG | HEART RATE: 64 BPM | SYSTOLIC BLOOD PRESSURE: 159 MMHG | TEMPERATURE: 98 F

## 2024-03-03 LAB
APTT BLD: 34.6 SEC — SIGNIFICANT CHANGE UP (ref 24.5–35.6)
GLUCOSE BLDC GLUCOMTR-MCNC: 147 MG/DL — HIGH (ref 70–99)
GLUCOSE BLDC GLUCOMTR-MCNC: 177 MG/DL — HIGH (ref 70–99)
HCT VFR BLD CALC: 35.5 % — LOW (ref 39–50)
HGB BLD-MCNC: 11.5 G/DL — LOW (ref 13–17)
INR BLD: 3.91 RATIO — HIGH (ref 0.85–1.18)
MCHC RBC-ENTMCNC: 28.7 PG — SIGNIFICANT CHANGE UP (ref 27–34)
MCHC RBC-ENTMCNC: 32.4 GM/DL — SIGNIFICANT CHANGE UP (ref 32–36)
MCV RBC AUTO: 88.5 FL — SIGNIFICANT CHANGE UP (ref 80–100)
NRBC # BLD: 0 /100 WBCS — SIGNIFICANT CHANGE UP (ref 0–0)
PLATELET # BLD AUTO: 153 K/UL — SIGNIFICANT CHANGE UP (ref 150–400)
PROTHROM AB SERPL-ACNC: 39.5 SEC — HIGH (ref 9.5–13)
RBC # BLD: 4.01 M/UL — LOW (ref 4.2–5.8)
RBC # FLD: 14.7 % — HIGH (ref 10.3–14.5)
WBC # BLD: 9.96 K/UL — SIGNIFICANT CHANGE UP (ref 3.8–10.5)
WBC # FLD AUTO: 9.96 K/UL — SIGNIFICANT CHANGE UP (ref 3.8–10.5)

## 2024-03-03 PROCEDURE — 85730 THROMBOPLASTIN TIME PARTIAL: CPT

## 2024-03-03 PROCEDURE — 84480 ASSAY TRIIODOTHYRONINE (T3): CPT

## 2024-03-03 PROCEDURE — 84100 ASSAY OF PHOSPHORUS: CPT

## 2024-03-03 PROCEDURE — 85610 PROTHROMBIN TIME: CPT

## 2024-03-03 PROCEDURE — 84484 ASSAY OF TROPONIN QUANT: CPT

## 2024-03-03 PROCEDURE — 96375 TX/PRO/DX INJ NEW DRUG ADDON: CPT

## 2024-03-03 PROCEDURE — 82962 GLUCOSE BLOOD TEST: CPT

## 2024-03-03 PROCEDURE — 84443 ASSAY THYROID STIM HORMONE: CPT

## 2024-03-03 PROCEDURE — 86901 BLOOD TYPING SEROLOGIC RH(D): CPT

## 2024-03-03 PROCEDURE — C1760: CPT

## 2024-03-03 PROCEDURE — 71045 X-RAY EXAM CHEST 1 VIEW: CPT

## 2024-03-03 PROCEDURE — 96374 THER/PROPH/DIAG INJ IV PUSH: CPT

## 2024-03-03 PROCEDURE — C1730: CPT

## 2024-03-03 PROCEDURE — C1766: CPT

## 2024-03-03 PROCEDURE — C1759: CPT

## 2024-03-03 PROCEDURE — 99291 CRITICAL CARE FIRST HOUR: CPT | Mod: 25

## 2024-03-03 PROCEDURE — C8929: CPT

## 2024-03-03 PROCEDURE — 87640 STAPH A DNA AMP PROBE: CPT

## 2024-03-03 PROCEDURE — 86850 RBC ANTIBODY SCREEN: CPT

## 2024-03-03 PROCEDURE — C1732: CPT

## 2024-03-03 PROCEDURE — 93005 ELECTROCARDIOGRAM TRACING: CPT

## 2024-03-03 PROCEDURE — 85025 COMPLETE CBC W/AUTO DIFF WBC: CPT

## 2024-03-03 PROCEDURE — C1893: CPT

## 2024-03-03 PROCEDURE — C1894: CPT

## 2024-03-03 PROCEDURE — 84436 ASSAY OF TOTAL THYROXINE: CPT

## 2024-03-03 PROCEDURE — 83735 ASSAY OF MAGNESIUM: CPT

## 2024-03-03 PROCEDURE — 99239 HOSP IP/OBS DSCHRG MGMT >30: CPT

## 2024-03-03 PROCEDURE — 86900 BLOOD TYPING SEROLOGIC ABO: CPT

## 2024-03-03 PROCEDURE — 80053 COMPREHEN METABOLIC PANEL: CPT

## 2024-03-03 PROCEDURE — 83036 HEMOGLOBIN GLYCOSYLATED A1C: CPT

## 2024-03-03 PROCEDURE — C1889: CPT

## 2024-03-03 PROCEDURE — 93655 ICAR CATH ABLTJ DSCRT ARRHYT: CPT

## 2024-03-03 PROCEDURE — 83880 ASSAY OF NATRIURETIC PEPTIDE: CPT

## 2024-03-03 PROCEDURE — 93656 COMPRE EP EVAL ABLTJ ATR FIB: CPT

## 2024-03-03 PROCEDURE — 93308 TTE F-UP OR LMTD: CPT

## 2024-03-03 PROCEDURE — 85027 COMPLETE CBC AUTOMATED: CPT

## 2024-03-03 PROCEDURE — 87641 MR-STAPH DNA AMP PROBE: CPT

## 2024-03-03 PROCEDURE — 80048 BASIC METABOLIC PNL TOTAL CA: CPT

## 2024-03-03 PROCEDURE — 36415 COLL VENOUS BLD VENIPUNCTURE: CPT

## 2024-03-03 RX ORDER — WARFARIN SODIUM 2.5 MG/1
5 TABLET ORAL ONCE
Refills: 0 | Status: DISCONTINUED | OUTPATIENT
Start: 2024-03-03 | End: 2024-03-03

## 2024-03-03 RX ORDER — CHLORTHALIDONE 50 MG
1 TABLET ORAL
Refills: 0 | DISCHARGE

## 2024-03-03 RX ORDER — WARFARIN SODIUM 2.5 MG/1
1 TABLET ORAL
Qty: 1 | Refills: 0
Start: 2024-03-03 | End: 2024-04-01

## 2024-03-03 RX ADMIN — Medication 81 MILLIGRAM(S): at 11:31

## 2024-03-03 RX ADMIN — Medication 1: at 11:46

## 2024-03-03 RX ADMIN — CHLORHEXIDINE GLUCONATE 1 APPLICATION(S): 213 SOLUTION TOPICAL at 05:18

## 2024-03-03 RX ADMIN — Medication 50 MILLIGRAM(S): at 11:31

## 2024-03-03 RX ADMIN — PANTOPRAZOLE SODIUM 40 MILLIGRAM(S): 20 TABLET, DELAYED RELEASE ORAL at 05:19

## 2024-03-03 RX ADMIN — Medication 50 MILLIGRAM(S): at 05:20

## 2024-03-03 RX ADMIN — MUPIROCIN 1 APPLICATION(S): 20 OINTMENT TOPICAL at 06:36

## 2024-03-03 NOTE — PROGRESS NOTE ADULT - SUBJECTIVE AND OBJECTIVE BOX
Priyanka Ramirez MD  Division of Hospital Medicine  Please contact via MS Teams (prefer message first)  Office: [phone]    Patient is a 53y old  Male who presents with a chief complaint of Aflutter (02 Mar 2024 11:43)      SUBJECTIVE / OVERNIGHT EVENTS:  no acute events overnight, vss, afebrile  pt feels well  denies chest pain, palpitation, bleeding anywhere    ROS:  14 point ROS negative in detail except stated as above    MEDICATIONS  (STANDING):  aspirin enteric coated 81 milliGRAM(s) Oral daily  atorvastatin 80 milliGRAM(s) Oral at bedtime  chlorhexidine 2% Cloths 1 Application(s) Topical <User Schedule>  dextrose 5%. 1000 milliLiter(s) (50 mL/Hr) IV Continuous <Continuous>  dextrose 5%. 1000 milliLiter(s) (100 mL/Hr) IV Continuous <Continuous>  dextrose 50% Injectable 12.5 Gram(s) IV Push once  dextrose 50% Injectable 25 Gram(s) IV Push once  dextrose 50% Injectable 25 Gram(s) IV Push once  glucagon  Injectable 1 milliGRAM(s) IntraMuscular once  influenza   Vaccine 0.5 milliLiter(s) IntraMuscular once  insulin lispro (ADMELOG) corrective regimen sliding scale   SubCutaneous at bedtime  insulin lispro (ADMELOG) corrective regimen sliding scale   SubCutaneous three times a day before meals  metoprolol tartrate 50 milliGRAM(s) Oral every 6 hours  mupirocin 2% Nasal 1 Application(s) Both Nostrils two times a day  pantoprazole    Tablet 40 milliGRAM(s) Oral before breakfast    MEDICATIONS  (PRN):  acetaminophen     Tablet .. 650 milliGRAM(s) Oral every 6 hours PRN Temp greater or equal to 38C (100.4F), Mild Pain (1 - 3)  dextrose Oral Gel 15 Gram(s) Oral once PRN Blood Glucose LESS THAN 70 milliGRAM(s)/deciliter      CAPILLARY BLOOD GLUCOSE      POCT Blood Glucose.: 147 mg/dL (03 Mar 2024 07:40)  POCT Blood Glucose.: 187 mg/dL (02 Mar 2024 21:10)  POCT Blood Glucose.: 229 mg/dL (02 Mar 2024 17:08)  POCT Blood Glucose.: 175 mg/dL (02 Mar 2024 11:39)    I&O's Summary    02 Mar 2024 07:01  -  03 Mar 2024 07:00  --------------------------------------------------------  IN: 560 mL / OUT: 0 mL / NET: 560 mL        PHYSICAL EXAM:  Vital Signs Last 24 Hrs  T(C): 36.6 (03 Mar 2024 11:02), Max: 36.9 (02 Mar 2024 20:41)  T(F): 97.9 (03 Mar 2024 11:02), Max: 98.4 (02 Mar 2024 20:41)  HR: 64 (03 Mar 2024 11:02) (61 - 65)  BP: 159/92 (03 Mar 2024 11:02) (99/56 - 169/97)  BP(mean): --  RR: 18 (03 Mar 2024 11:02) (18 - 18)  SpO2: 98% (03 Mar 2024 11:02) (92% - 98%)    Parameters below as of 03 Mar 2024 11:02  Patient On (Oxygen Delivery Method): room air      GENERAL: NAD, well-developed  HEAD:  Atraumatic, Normocephalic  EYES: EOMI, PERRLA, conjunctiva and sclera clear  NECK: Supple, No JVD  CHEST/LUNG: Clear to auscultation bilaterally; No wheeze  HEART: Regular rate and rhythm; No murmurs, rubs, or gallops  ABDOMEN: Soft, Nontender, Nondistended; Bowel sounds present  EXTREMITIES:  2+ Peripheral Pulses, No clubbing, cyanosis, or edema  NEUROLOGY: AAOx3; non-focal  SKIN: No rashes or lesions    LABS:                        11.5   9.96  )-----------( 153      ( 03 Mar 2024 06:05 )             35.5           PT/INR - ( 03 Mar 2024 06:05 )   PT: 39.5 sec;   INR: 3.91 ratio         PTT - ( 03 Mar 2024 06:05 )  PTT:34.6 sec          RADIOLOGY & ADDITIONAL TESTS:    Imaging Personally Reviewed:    Consultant(s) Notes Reviewed:  EP    Care Discussed with Consultants/Other Providers:

## 2024-03-03 NOTE — PROGRESS NOTE ADULT - PROBLEM SELECTOR PLAN 6
pending INR in AM, if downtrending dc planning
dc planning
Pending clinical course- ablation on 3/1   Independent

## 2024-03-03 NOTE — PROGRESS NOTE ADULT - PROVIDER SPECIALTY LIST ADULT
Electrophysiology
Hospitalist
Electrophysiology
Hospitalist
Hospitalist
Internal Medicine
Hospitalist

## 2024-03-03 NOTE — PROGRESS NOTE ADULT - NSPROGADDITIONALINFOA_GEN_ALL_CORE
above plans discussed with medicine ACP Mariposa  pt stable for discharge with close follow up with EP  44 minutes spent on discharge process above plans discussed with medicine ACP Mariposa  pt stable for discharge with close follow up with EP  INR trending down without signs of bleeding  advised to take 5mg tonight and Monday. Has appt with PCP for INR check on Tues  44 minutes spent on discharge process

## 2024-03-03 NOTE — PROGRESS NOTE ADULT - PROBLEM SELECTOR PLAN 1
Continue Lopressor 50 mg PO Q6  s/p ablation on 3/1  resumed coumadin 3mg   INR now downtrending Continue Lopressor 50 mg PO Q6  s/p ablation on 3/1  resumed coumadin 5mg: Pt to follow up with PCP on Tuesday for iNR check  INR now downtrending

## 2024-03-03 NOTE — PROGRESS NOTE ADULT - PROBLEM SELECTOR PLAN 4
Holding home oral agents  FSG with ISS  HgA1c testing

## 2024-03-03 NOTE — PROGRESS NOTE ADULT - PROBLEM SELECTOR PLAN 2
Improving  Continue to hold home Enalapril and Chlorthalidone   Avoid nephrotoxic medications  Renally dose all medications

## 2024-03-03 NOTE — PROGRESS NOTE ADULT - PROBLEM SELECTOR PLAN 5
Holding home Enalapril and Chlorthalidone due to DEEPTHI as above

## 2024-03-06 NOTE — CHART NOTE - NSCHARTNOTEFT_GEN_A_CORE
54 yo M with PMH of sev MR and AI s/p bio AVR and bio MVR 9/2015, AFlutter s/p ablation 1/2018, DM, HLD, HCV (neg VL), and pheochromocytoma s/p adrenalectomy presented to outpatient clinic with me today for routine follow up.    Patient reported SOB found to be tachycardic on exam.   EKG completed showing SVT c/w short RP tachycardia, likely AVNRT based on symptoms and robles A waves on exam  Attempted Vagal maneuvers with carotid massage and blowing into a straw but no success  Pt walked by me to the ED for Dx/Tx    Recommend routine labs, EKG, and adenosine  Can call EP if pt is admitted or additional help needed    Colton Salas PGY5  Cardiology
MEDICINE NP    Notified by RN patient with SVT. Seen and examined patient at bedside. Patient is alert, NAD. Denies HA, CP, SOB, cough, N/V, or abd pain. patient with SVT hear rate 160's discuss patient with cardiology patient given Iv cardizem and PO catdizem    VITAL SIGNS:  T(C): 36.4 (02-28-24 @ 22:45), Max: 36.8 (02-28-24 @ 20:39)  HR: 98 (02-28-24 @ 23:45) (70 - 161)  BP: 110/67 (02-28-24 @ 23:45) (102/77 - 122/81)  RR: 18 (02-28-24 @ 22:45) (18 - 18)  SpO2: 98% (02-28-24 @ 22:45) (95% - 98%)  Wt(kg): --      LABORATORY:                          12.0   8.75  )-----------( 200      ( 28 Feb 2024 06:59 )             37.0       02-29    136  |  103  |  34<H>  ----------------------------<  150<H>  4.5   |  21<L>  |  1.41<H>    Ca    9.8      29 Feb 2024 00:02  Phos  3.8     02-29  Mg     2.1     02-29    TPro  7.9  /  Alb  4.2  /  TBili  0.6  /  DBili  x   /  AST  35  /  ALT  22  /  AlkPhos  81  02-28          MICROBIOLOGY:           RADIOLOGY:          PHYSICAL EXAM:    Constitutional: AOx3. NAD.    Respiratory: clear lungs bilaterally. No wheezing, rhonchi, or crackles.    Cardiovascular: S1 S2. No murmurs.    Gastrointestinal: BS X4 active. soft. nontender.    Extremities/Vascular: +2 pulses bilaterally. No BLE edema.      ASSESSMENT/PLAN:   HPI:  Pt is a 54yo M w/ PMH of HTN, HLD, DM2, CAD, Afib, pheo s/p right adrenalectomy pw narrow complex tachycardia to 150s at cardiology clinic.     He denies any symptoms, reports was routine f/u though did experience some exertional dyspnea over the past week. In the ED he was noted to have 2:1 atypical flutter s/p adenosine w/out improvement. He was also administered dilt IV and started on gtt.     His other vitals were otherwise stable and labs notable for Cr 2.02 w/ baseline ~1.  Fond in SVT      1) SVT heart rate 160  -Discuss patient with cardiology  -Cardizem 10mg IVP  -Oral Cardizem 30mg q6  -c/w Tele  -F/U primary team in AM
Patient was outreached but did not answer nor could a voicemail be left [phone]
Brief EP Update    Will plan on AFlutter ablation tomorrow 2.29 with Dr. Choe  NPO after midnight  Please order T&S
Patient was outreached but did not answer nor could a voicemail be left [phone]

## 2024-03-08 ENCOUNTER — NON-APPOINTMENT (OUTPATIENT)
Age: 54
End: 2024-03-08

## 2024-03-26 ENCOUNTER — RX RENEWAL (OUTPATIENT)
Age: 54
End: 2024-03-26

## 2024-03-26 RX ORDER — ATORVASTATIN CALCIUM 80 MG/1
80 TABLET, FILM COATED ORAL
Qty: 90 | Refills: 4 | Status: ACTIVE | COMMUNITY
Start: 2018-08-07 | End: 1900-01-01

## 2024-04-10 ENCOUNTER — APPOINTMENT (OUTPATIENT)
Dept: ELECTROPHYSIOLOGY | Facility: CLINIC | Age: 54
End: 2024-04-10
Payer: MEDICAID

## 2024-04-10 ENCOUNTER — NON-APPOINTMENT (OUTPATIENT)
Age: 54
End: 2024-04-10

## 2024-04-10 VITALS
SYSTOLIC BLOOD PRESSURE: 144 MMHG | OXYGEN SATURATION: 99 % | WEIGHT: 230 LBS | HEART RATE: 76 BPM | BODY MASS INDEX: 38.27 KG/M2 | DIASTOLIC BLOOD PRESSURE: 88 MMHG

## 2024-04-10 DIAGNOSIS — E11.9 TYPE 2 DIABETES MELLITUS W/OUT COMPLICATIONS: ICD-10-CM

## 2024-04-10 PROCEDURE — 93000 ELECTROCARDIOGRAM COMPLETE: CPT

## 2024-04-10 PROCEDURE — 99214 OFFICE O/P EST MOD 30 MIN: CPT | Mod: 25

## 2024-04-10 RX ORDER — EZETIMIBE 10 MG/1
10 TABLET ORAL
Qty: 90 | Refills: 2 | Status: DISCONTINUED | COMMUNITY
Start: 2023-05-02 | End: 2024-04-10

## 2024-04-10 RX ORDER — CHLORTHALIDONE 25 MG/1
25 TABLET ORAL DAILY
Qty: 90 | Refills: 3 | Status: DISCONTINUED | COMMUNITY
Start: 2022-10-25 | End: 2024-04-10

## 2024-04-10 RX ORDER — METFORMIN ER 750 MG 750 MG/1
750 TABLET ORAL DAILY
Refills: 0 | Status: ACTIVE | COMMUNITY

## 2024-04-10 RX ORDER — ENALAPRIL MALEATE 5 MG/1
5 TABLET ORAL TWICE DAILY
Qty: 180 | Refills: 3 | Status: DISCONTINUED | COMMUNITY
Start: 2019-07-10 | End: 2024-04-10

## 2024-04-10 RX ORDER — GLIMEPIRIDE 2 MG/1
2 TABLET ORAL DAILY
Refills: 0 | Status: ACTIVE | COMMUNITY

## 2024-04-10 RX ORDER — PIOGLITAZONE HYDROCHLORIDE 45 MG/1
45 TABLET ORAL DAILY
Refills: 0 | Status: ACTIVE | COMMUNITY

## 2024-04-10 RX ORDER — WARFARIN 7.5 MG/1
7.5 TABLET ORAL DAILY
Qty: 30 | Refills: 0 | Status: ACTIVE | COMMUNITY
Start: 1900-01-01 | End: 1900-01-01

## 2024-04-12 NOTE — REASON FOR VISIT
[FreeTextEntry1] : This is a 53-year-old man with a past medical history of severe mitral regurgitation and aortic insufficiency s/p bio AVR/MVR in 2015, atrial flutter s/p ablation 2018, diabetes, hyperlipidemia and pheochromocytoma s/p adrenalectomy.  He presents to the general cardiology clinic in late February 2024 with palpitations and was found to be in atrial flutter.  He was sent to the emergency room and ultimately brought for EP study, during which she was found to be in counterclockwise mitral flutter.  Pulmonary vein isolation and an anterior mitral isthmus line were created for termination of the arrhythmia back to sinus rhythm.  Durable CTI block was observed.  He was discharged home and returns now for follow-up, noting no palpitations, chest pain, shortness of breath, dizziness or syncope.  He notes normal access site healing and has had no fevers or dysphagia.

## 2024-04-12 NOTE — DISCUSSION/SUMMARY
[FreeTextEntry1] : In summary this is a 53-year-old man with recent admission for mitral annular flutter now status post ablation.  I am pleased to see him doing well today and maintaining sinus rhythm.  He will follow-up in 6 months.  He appeared to understand the whole discussion and verbalized that all of his questions were answered to his satisfaction.  Thank you for allowing me to be involved in the care of this pleasant man. Please feel free to contact me with any questions.    Marcell Choe MD  of Cardiology Electrophysiology Section [address] [place] [address] Office: [phone] Fax: [phone] [EKG obtained to assist in diagnosis and management of assessed problem(s)] : EKG obtained to assist in diagnosis and management of assessed problem(s)

## 2024-06-12 ENCOUNTER — INPATIENT (INPATIENT)
Facility: HOSPITAL | Age: 54
LOS: 3 days | Discharge: HOME | DRG: 310 | End: 2024-06-16
Attending: HOSPITALIST | Admitting: STUDENT IN AN ORGANIZED HEALTH CARE EDUCATION/TRAINING PROGRAM
Payer: MEDICAID

## 2024-06-12 VITALS
HEART RATE: 166 BPM | RESPIRATION RATE: 22 BRPM | HEIGHT: 68 IN | SYSTOLIC BLOOD PRESSURE: 95 MMHG | TEMPERATURE: 98 F | WEIGHT: 238.98 LBS | DIASTOLIC BLOOD PRESSURE: 81 MMHG | OXYGEN SATURATION: 95 %

## 2024-06-12 DIAGNOSIS — Z98.89 OTHER SPECIFIED POSTPROCEDURAL STATES: Chronic | ICD-10-CM

## 2024-06-12 DIAGNOSIS — I48.92 UNSPECIFIED ATRIAL FLUTTER: ICD-10-CM

## 2024-06-12 DIAGNOSIS — Z95.2 PRESENCE OF PROSTHETIC HEART VALVE: Chronic | ICD-10-CM

## 2024-06-12 DIAGNOSIS — Z86.018 PERSONAL HISTORY OF OTHER BENIGN NEOPLASM: Chronic | ICD-10-CM

## 2024-06-12 LAB
ALBUMIN SERPL ELPH-MCNC: 3.7 G/DL — SIGNIFICANT CHANGE UP (ref 3.3–5)
ALP SERPL-CCNC: 85 U/L — SIGNIFICANT CHANGE UP (ref 40–120)
ALT FLD-CCNC: 24 U/L — SIGNIFICANT CHANGE UP (ref 10–45)
ANION GAP SERPL CALC-SCNC: 15 MMOL/L — SIGNIFICANT CHANGE UP (ref 5–17)
AST SERPL-CCNC: 33 U/L — SIGNIFICANT CHANGE UP (ref 10–40)
BASOPHILS # BLD AUTO: 0.06 K/UL — SIGNIFICANT CHANGE UP (ref 0–0.2)
BASOPHILS NFR BLD AUTO: 0.5 % — SIGNIFICANT CHANGE UP (ref 0–2)
BILIRUB SERPL-MCNC: 0.8 MG/DL — SIGNIFICANT CHANGE UP (ref 0.2–1.2)
BUN SERPL-MCNC: 24 MG/DL — HIGH (ref 7–23)
CALCIUM SERPL-MCNC: 9.5 MG/DL — SIGNIFICANT CHANGE UP (ref 8.4–10.5)
CHLORIDE SERPL-SCNC: 102 MMOL/L — SIGNIFICANT CHANGE UP (ref 96–108)
CO2 SERPL-SCNC: 16 MMOL/L — LOW (ref 22–31)
CREAT SERPL-MCNC: 1.25 MG/DL — SIGNIFICANT CHANGE UP (ref 0.5–1.3)
EGFR: 69 ML/MIN/1.73M2 — SIGNIFICANT CHANGE UP
EOSINOPHIL # BLD AUTO: 0.13 K/UL — SIGNIFICANT CHANGE UP (ref 0–0.5)
EOSINOPHIL NFR BLD AUTO: 1.1 % — SIGNIFICANT CHANGE UP (ref 0–6)
GLUCOSE SERPL-MCNC: 170 MG/DL — HIGH (ref 70–99)
HCT VFR BLD CALC: 36 % — LOW (ref 39–50)
HGB BLD-MCNC: 11.8 G/DL — LOW (ref 13–17)
IMM GRANULOCYTES NFR BLD AUTO: 0.4 % — SIGNIFICANT CHANGE UP (ref 0–0.9)
LIDOCAIN IGE QN: 48 U/L — SIGNIFICANT CHANGE UP (ref 7–60)
LYMPHOCYTES # BLD AUTO: 2.35 K/UL — SIGNIFICANT CHANGE UP (ref 1–3.3)
LYMPHOCYTES # BLD AUTO: 20.3 % — SIGNIFICANT CHANGE UP (ref 13–44)
MAGNESIUM SERPL-MCNC: 1.8 MG/DL — SIGNIFICANT CHANGE UP (ref 1.6–2.6)
MCHC RBC-ENTMCNC: 29.1 PG — SIGNIFICANT CHANGE UP (ref 27–34)
MCHC RBC-ENTMCNC: 32.8 GM/DL — SIGNIFICANT CHANGE UP (ref 32–36)
MCV RBC AUTO: 88.7 FL — SIGNIFICANT CHANGE UP (ref 80–100)
MONOCYTES # BLD AUTO: 1.32 K/UL — HIGH (ref 0–0.9)
MONOCYTES NFR BLD AUTO: 11.4 % — SIGNIFICANT CHANGE UP (ref 2–14)
NEUTROPHILS # BLD AUTO: 7.69 K/UL — HIGH (ref 1.8–7.4)
NEUTROPHILS NFR BLD AUTO: 66.3 % — SIGNIFICANT CHANGE UP (ref 43–77)
NRBC # BLD: 0 /100 WBCS — SIGNIFICANT CHANGE UP (ref 0–0)
NT-PROBNP SERPL-SCNC: 3910 PG/ML — HIGH (ref 0–300)
PLATELET # BLD AUTO: 164 K/UL — SIGNIFICANT CHANGE UP (ref 150–400)
POTASSIUM SERPL-MCNC: 4.4 MMOL/L — SIGNIFICANT CHANGE UP (ref 3.5–5.3)
POTASSIUM SERPL-SCNC: 4.4 MMOL/L — SIGNIFICANT CHANGE UP (ref 3.5–5.3)
PROT SERPL-MCNC: 7.4 G/DL — SIGNIFICANT CHANGE UP (ref 6–8.3)
RBC # BLD: 4.06 M/UL — LOW (ref 4.2–5.8)
RBC # FLD: 13.7 % — SIGNIFICANT CHANGE UP (ref 10.3–14.5)
SODIUM SERPL-SCNC: 133 MMOL/L — LOW (ref 135–145)
TROPONIN T, HIGH SENSITIVITY RESULT: 52 NG/L — HIGH (ref 0–51)
WBC # BLD: 11.6 K/UL — HIGH (ref 3.8–10.5)
WBC # FLD AUTO: 11.6 K/UL — HIGH (ref 3.8–10.5)

## 2024-06-12 PROCEDURE — 99291 CRITICAL CARE FIRST HOUR: CPT

## 2024-06-12 PROCEDURE — 71045 X-RAY EXAM CHEST 1 VIEW: CPT | Mod: 26

## 2024-06-12 RX ORDER — DILTIAZEM HYDROCHLORIDE 240 MG/1
10 CAPSULE, EXTENDED RELEASE ORAL ONCE
Refills: 0 | Status: DISCONTINUED | OUTPATIENT
Start: 2024-06-12 | End: 2024-06-12

## 2024-06-12 RX ORDER — FUROSEMIDE 10 MG/ML
20 INJECTION, SOLUTION INTRAMUSCULAR; INTRAVENOUS ONCE
Refills: 0 | Status: COMPLETED | OUTPATIENT
Start: 2024-06-12 | End: 2024-06-12

## 2024-06-12 RX ORDER — DILTIAZEM HYDROCHLORIDE 240 MG/1
25 CAPSULE, EXTENDED RELEASE ORAL ONCE
Refills: 0 | Status: COMPLETED | OUTPATIENT
Start: 2024-06-12 | End: 2024-06-12

## 2024-06-12 RX ORDER — SODIUM CHLORIDE 0.9 % (FLUSH) 0.9 %
1000 SYRINGE (ML) INJECTION ONCE
Refills: 0 | Status: COMPLETED | OUTPATIENT
Start: 2024-06-12 | End: 2024-06-12

## 2024-06-12 RX ORDER — ACETAMINOPHEN 325 MG
650 TABLET ORAL EVERY 6 HOURS
Refills: 0 | Status: DISCONTINUED | OUTPATIENT
Start: 2024-06-12 | End: 2024-06-16

## 2024-06-12 RX ORDER — DILTIAZEM HYDROCHLORIDE 240 MG/1
60 CAPSULE, EXTENDED RELEASE ORAL ONCE
Refills: 0 | Status: COMPLETED | OUTPATIENT
Start: 2024-06-12 | End: 2024-06-12

## 2024-06-12 RX ADMIN — DILTIAZEM HYDROCHLORIDE 25 MILLIGRAM(S): 240 CAPSULE, EXTENDED RELEASE ORAL at 20:23

## 2024-06-12 RX ADMIN — Medication 1000 MILLILITER(S): at 20:31

## 2024-06-12 RX ADMIN — FUROSEMIDE 20 MILLIGRAM(S): 10 INJECTION, SOLUTION INTRAMUSCULAR; INTRAVENOUS at 22:35

## 2024-06-12 RX ADMIN — DILTIAZEM HYDROCHLORIDE 60 MILLIGRAM(S): 240 CAPSULE, EXTENDED RELEASE ORAL at 22:34

## 2024-06-12 RX ADMIN — DILTIAZEM HYDROCHLORIDE 25 MILLIGRAM(S): 240 CAPSULE, EXTENDED RELEASE ORAL at 22:35

## 2024-06-13 DIAGNOSIS — I10 ESSENTIAL (PRIMARY) HYPERTENSION: ICD-10-CM

## 2024-06-13 DIAGNOSIS — D72.829 ELEVATED WHITE BLOOD CELL COUNT, UNSPECIFIED: ICD-10-CM

## 2024-06-13 DIAGNOSIS — I50.22 CHRONIC SYSTOLIC (CONGESTIVE) HEART FAILURE: ICD-10-CM

## 2024-06-13 DIAGNOSIS — R65.10 SYSTEMIC INFLAMMATORY RESPONSE SYNDROME (SIRS) OF NON-INFECTIOUS ORIGIN WITHOUT ACUTE ORGAN DYSFUNCTION: ICD-10-CM

## 2024-06-13 DIAGNOSIS — E78.5 HYPERLIPIDEMIA, UNSPECIFIED: ICD-10-CM

## 2024-06-13 DIAGNOSIS — Z29.9 ENCOUNTER FOR PROPHYLACTIC MEASURES, UNSPECIFIED: ICD-10-CM

## 2024-06-13 DIAGNOSIS — R79.89 OTHER SPECIFIED ABNORMAL FINDINGS OF BLOOD CHEMISTRY: ICD-10-CM

## 2024-06-13 DIAGNOSIS — E11.9 TYPE 2 DIABETES MELLITUS WITHOUT COMPLICATIONS: ICD-10-CM

## 2024-06-13 DIAGNOSIS — I25.10 ATHEROSCLEROTIC HEART DISEASE OF NATIVE CORONARY ARTERY WITHOUT ANGINA PECTORIS: ICD-10-CM

## 2024-06-13 DIAGNOSIS — I47.10 SUPRAVENTRICULAR TACHYCARDIA, UNSPECIFIED: ICD-10-CM

## 2024-06-13 LAB
A1C WITH ESTIMATED AVERAGE GLUCOSE RESULT: 6.9 % — HIGH (ref 4–5.6)
ADD ON TEST-SPECIMEN IN LAB: SIGNIFICANT CHANGE UP
ANION GAP SERPL CALC-SCNC: 14 MMOL/L — SIGNIFICANT CHANGE UP (ref 5–17)
APTT BLD: 33.5 SEC — SIGNIFICANT CHANGE UP (ref 24.5–35.6)
APTT BLD: 35.3 SEC — SIGNIFICANT CHANGE UP (ref 24.5–35.6)
BUN SERPL-MCNC: 21 MG/DL — SIGNIFICANT CHANGE UP (ref 7–23)
CALCIUM SERPL-MCNC: 9.1 MG/DL — SIGNIFICANT CHANGE UP (ref 8.4–10.5)
CHLORIDE SERPL-SCNC: 103 MMOL/L — SIGNIFICANT CHANGE UP (ref 96–108)
CO2 SERPL-SCNC: 20 MMOL/L — LOW (ref 22–31)
CREAT SERPL-MCNC: 1.18 MG/DL — SIGNIFICANT CHANGE UP (ref 0.5–1.3)
EGFR: 74 ML/MIN/1.73M2 — SIGNIFICANT CHANGE UP
ESTIMATED AVERAGE GLUCOSE: 151 MG/DL — HIGH (ref 68–114)
FLUAV AG NPH QL: SIGNIFICANT CHANGE UP
FLUBV AG NPH QL: SIGNIFICANT CHANGE UP
GLUCOSE BLDC GLUCOMTR-MCNC: 139 MG/DL — HIGH (ref 70–99)
GLUCOSE BLDC GLUCOMTR-MCNC: 148 MG/DL — HIGH (ref 70–99)
GLUCOSE BLDC GLUCOMTR-MCNC: 242 MG/DL — HIGH (ref 70–99)
GLUCOSE SERPL-MCNC: 132 MG/DL — HIGH (ref 70–99)
HCT VFR BLD CALC: 35.4 % — LOW (ref 39–50)
HGB BLD-MCNC: 11.7 G/DL — LOW (ref 13–17)
INR BLD: 2.33 RATIO — HIGH (ref 0.85–1.18)
INR BLD: 2.97 RATIO — HIGH (ref 0.85–1.18)
MAGNESIUM SERPL-MCNC: 1.8 MG/DL — SIGNIFICANT CHANGE UP (ref 1.6–2.6)
MCHC RBC-ENTMCNC: 29.5 PG — SIGNIFICANT CHANGE UP (ref 27–34)
MCHC RBC-ENTMCNC: 33.1 GM/DL — SIGNIFICANT CHANGE UP (ref 32–36)
MCV RBC AUTO: 89.2 FL — SIGNIFICANT CHANGE UP (ref 80–100)
NRBC # BLD: 0 /100 WBCS — SIGNIFICANT CHANGE UP (ref 0–0)
PHOSPHATE SERPL-MCNC: 3.1 MG/DL — SIGNIFICANT CHANGE UP (ref 2.5–4.5)
PLATELET # BLD AUTO: 153 K/UL — SIGNIFICANT CHANGE UP (ref 150–400)
POTASSIUM SERPL-MCNC: 3.9 MMOL/L — SIGNIFICANT CHANGE UP (ref 3.5–5.3)
POTASSIUM SERPL-SCNC: 3.9 MMOL/L — SIGNIFICANT CHANGE UP (ref 3.5–5.3)
PROTHROM AB SERPL-ACNC: 25 SEC — HIGH (ref 9.5–13)
PROTHROM AB SERPL-ACNC: 30.2 SEC — HIGH (ref 9.5–13)
RBC # BLD: 3.97 M/UL — LOW (ref 4.2–5.8)
RBC # FLD: 13.8 % — SIGNIFICANT CHANGE UP (ref 10.3–14.5)
RSV RNA NPH QL NAA+NON-PROBE: SIGNIFICANT CHANGE UP
SARS-COV-2 RNA SPEC QL NAA+PROBE: SIGNIFICANT CHANGE UP
SODIUM SERPL-SCNC: 137 MMOL/L — SIGNIFICANT CHANGE UP (ref 135–145)
T4 FREE SERPL-MCNC: 1.3 NG/DL — SIGNIFICANT CHANGE UP (ref 0.9–1.8)
TROPONIN T, HIGH SENSITIVITY RESULT: 48 NG/L — SIGNIFICANT CHANGE UP (ref 0–51)
TROPONIN T, HIGH SENSITIVITY RESULT: 56 NG/L — HIGH (ref 0–51)
TSH SERPL-MCNC: 1.65 UIU/ML — SIGNIFICANT CHANGE UP (ref 0.27–4.2)
WBC # BLD: 9.9 K/UL — SIGNIFICANT CHANGE UP (ref 3.8–10.5)
WBC # FLD AUTO: 9.9 K/UL — SIGNIFICANT CHANGE UP (ref 3.8–10.5)

## 2024-06-13 PROCEDURE — 12345: CPT | Mod: NC,GC

## 2024-06-13 PROCEDURE — 99223 1ST HOSP IP/OBS HIGH 75: CPT

## 2024-06-13 RX ORDER — GLIMEPIRIDE 1 MG
1 TABLET ORAL
Refills: 0 | DISCHARGE

## 2024-06-13 RX ORDER — ASPIRIN 325 MG/1
81 TABLET, FILM COATED ORAL DAILY
Refills: 0 | Status: DISCONTINUED | OUTPATIENT
Start: 2024-06-13 | End: 2024-06-16

## 2024-06-13 RX ORDER — FUROSEMIDE 10 MG/ML
20 INJECTION, SOLUTION INTRAMUSCULAR; INTRAVENOUS ONCE
Refills: 0 | Status: DISCONTINUED | OUTPATIENT
Start: 2024-06-13 | End: 2024-06-14

## 2024-06-13 RX ORDER — PIOGLITAZONE HYDROCHLORIDE 15 MG/1
1 TABLET ORAL
Refills: 0 | DISCHARGE

## 2024-06-13 RX ORDER — DEXTROSE 30 % IN WATER 30 %
12.5 VIAL (ML) INTRAVENOUS ONCE
Refills: 0 | Status: DISCONTINUED | OUTPATIENT
Start: 2024-06-13 | End: 2024-06-16

## 2024-06-13 RX ORDER — DILTIAZEM HYDROCHLORIDE 240 MG/1
5 CAPSULE, EXTENDED RELEASE ORAL
Qty: 125 | Refills: 0 | Status: DISCONTINUED | OUTPATIENT
Start: 2024-06-13 | End: 2024-06-13

## 2024-06-13 RX ORDER — METFORMIN HYDROCHLORIDE 850 MG/1
1 TABLET ORAL
Refills: 0 | DISCHARGE

## 2024-06-13 RX ORDER — DILTIAZEM HYDROCHLORIDE 240 MG/1
60 CAPSULE, EXTENDED RELEASE ORAL DAILY
Refills: 0 | Status: DISCONTINUED | OUTPATIENT
Start: 2024-06-13 | End: 2024-06-13

## 2024-06-13 RX ORDER — MAGNESIUM SULFATE 100 %
2 POWDER (GRAM) MISCELLANEOUS ONCE
Refills: 0 | Status: COMPLETED | OUTPATIENT
Start: 2024-06-13 | End: 2024-06-13

## 2024-06-13 RX ORDER — ATORVASTATIN CALCIUM 80 MG/1
1 TABLET, FILM COATED ORAL
Refills: 0 | DISCHARGE

## 2024-06-13 RX ORDER — POLYETHYLENE GLYCOL 3350 1 G/G
17 POWDER ORAL
Refills: 0 | Status: DISCONTINUED | OUTPATIENT
Start: 2024-06-13 | End: 2024-06-14

## 2024-06-13 RX ORDER — DILTIAZEM HYDROCHLORIDE 240 MG/1
30 CAPSULE, EXTENDED RELEASE ORAL ONCE
Refills: 0 | Status: COMPLETED | OUTPATIENT
Start: 2024-06-13 | End: 2024-06-13

## 2024-06-13 RX ORDER — WARFARIN SODIUM 4 MG/1
7.5 TABLET ORAL ONCE
Refills: 0 | Status: COMPLETED | OUTPATIENT
Start: 2024-06-13 | End: 2024-06-13

## 2024-06-13 RX ORDER — WARFARIN SODIUM 4 MG/1
1 TABLET ORAL
Refills: 0 | DISCHARGE

## 2024-06-13 RX ORDER — DEXTROSE 30 % IN WATER 30 %
25 VIAL (ML) INTRAVENOUS ONCE
Refills: 0 | Status: DISCONTINUED | OUTPATIENT
Start: 2024-06-13 | End: 2024-06-16

## 2024-06-13 RX ORDER — PANTOPRAZOLE SODIUM 40 MG/10ML
40 INJECTION, POWDER, FOR SOLUTION INTRAVENOUS
Refills: 0 | Status: DISCONTINUED | OUTPATIENT
Start: 2024-06-13 | End: 2024-06-14

## 2024-06-13 RX ORDER — INSULIN LISPRO 100 [IU]/ML
INJECTION, SOLUTION SUBCUTANEOUS EVERY 6 HOURS
Refills: 0 | Status: DISCONTINUED | OUTPATIENT
Start: 2024-06-13 | End: 2024-06-16

## 2024-06-13 RX ORDER — ATORVASTATIN CALCIUM 20 MG/1
80 TABLET, FILM COATED ORAL AT BEDTIME
Refills: 0 | Status: DISCONTINUED | OUTPATIENT
Start: 2024-06-13 | End: 2024-06-16

## 2024-06-13 RX ORDER — DEXTROSE 30 % IN WATER 30 %
15 VIAL (ML) INTRAVENOUS ONCE
Refills: 0 | Status: DISCONTINUED | OUTPATIENT
Start: 2024-06-13 | End: 2024-06-16

## 2024-06-13 RX ORDER — DILTIAZEM HYDROCHLORIDE 240 MG/1
30 CAPSULE, EXTENDED RELEASE ORAL EVERY 6 HOURS
Refills: 0 | Status: COMPLETED | OUTPATIENT
Start: 2024-06-13 | End: 2024-06-13

## 2024-06-13 RX ORDER — SENNOSIDES 8.6 MG
2 TABLET ORAL AT BEDTIME
Refills: 0 | Status: DISCONTINUED | OUTPATIENT
Start: 2024-06-13 | End: 2024-06-16

## 2024-06-13 RX ORDER — GLUCAGON HYDROCHLORIDE 1 MG/ML
1 INJECTION, POWDER, FOR SOLUTION INTRAMUSCULAR; INTRAVENOUS; SUBCUTANEOUS ONCE
Refills: 0 | Status: DISCONTINUED | OUTPATIENT
Start: 2024-06-13 | End: 2024-06-16

## 2024-06-13 RX ADMIN — DILTIAZEM HYDROCHLORIDE 30 MILLIGRAM(S): 240 CAPSULE, EXTENDED RELEASE ORAL at 14:40

## 2024-06-13 RX ADMIN — DILTIAZEM HYDROCHLORIDE 30 MILLIGRAM(S): 240 CAPSULE, EXTENDED RELEASE ORAL at 05:09

## 2024-06-13 RX ADMIN — POLYETHYLENE GLYCOL 3350 17 GRAM(S): 1 POWDER ORAL at 14:39

## 2024-06-13 RX ADMIN — POLYETHYLENE GLYCOL 3350 17 GRAM(S): 1 POWDER ORAL at 21:07

## 2024-06-13 RX ADMIN — ATORVASTATIN CALCIUM 80 MILLIGRAM(S): 20 TABLET, FILM COATED ORAL at 21:20

## 2024-06-13 RX ADMIN — WARFARIN SODIUM 7.5 MILLIGRAM(S): 4 TABLET ORAL at 01:21

## 2024-06-13 RX ADMIN — ASPIRIN 81 MILLIGRAM(S): 325 TABLET, FILM COATED ORAL at 14:40

## 2024-06-13 RX ADMIN — Medication 3 MILLIGRAM(S): at 22:31

## 2024-06-13 RX ADMIN — Medication 25 GRAM(S): at 05:24

## 2024-06-13 RX ADMIN — DILTIAZEM HYDROCHLORIDE 30 MILLIGRAM(S): 240 CAPSULE, EXTENDED RELEASE ORAL at 21:07

## 2024-06-13 RX ADMIN — PANTOPRAZOLE SODIUM 40 MILLIGRAM(S): 40 INJECTION, POWDER, FOR SOLUTION INTRAVENOUS at 05:10

## 2024-06-13 RX ADMIN — DILTIAZEM HYDROCHLORIDE 30 MILLIGRAM(S): 240 CAPSULE, EXTENDED RELEASE ORAL at 15:13

## 2024-06-13 RX ADMIN — Medication 2 TABLET(S): at 21:07

## 2024-06-13 RX ADMIN — INSULIN LISPRO 2: 100 INJECTION, SOLUTION SUBCUTANEOUS at 18:17

## 2024-06-14 ENCOUNTER — RESULT REVIEW (OUTPATIENT)
Age: 54
End: 2024-06-14

## 2024-06-14 ENCOUNTER — TRANSCRIPTION ENCOUNTER (OUTPATIENT)
Age: 54
End: 2024-06-14

## 2024-06-14 LAB
ANION GAP SERPL CALC-SCNC: 12 MMOL/L — SIGNIFICANT CHANGE UP (ref 5–17)
APTT BLD: 33.7 SEC — SIGNIFICANT CHANGE UP (ref 24.5–35.6)
BLD GP AB SCN SERPL QL: NEGATIVE — SIGNIFICANT CHANGE UP
BUN SERPL-MCNC: 21 MG/DL — SIGNIFICANT CHANGE UP (ref 7–23)
CALCIUM SERPL-MCNC: 9.1 MG/DL — SIGNIFICANT CHANGE UP (ref 8.4–10.5)
CHLORIDE SERPL-SCNC: 103 MMOL/L — SIGNIFICANT CHANGE UP (ref 96–108)
CO2 SERPL-SCNC: 20 MMOL/L — LOW (ref 22–31)
CREAT SERPL-MCNC: 1.13 MG/DL — SIGNIFICANT CHANGE UP (ref 0.5–1.3)
EGFR: 78 ML/MIN/1.73M2 — SIGNIFICANT CHANGE UP
GLUCOSE BLDC GLUCOMTR-MCNC: 151 MG/DL — HIGH (ref 70–99)
GLUCOSE BLDC GLUCOMTR-MCNC: 153 MG/DL — HIGH (ref 70–99)
GLUCOSE BLDC GLUCOMTR-MCNC: 158 MG/DL — HIGH (ref 70–99)
GLUCOSE BLDC GLUCOMTR-MCNC: 194 MG/DL — HIGH (ref 70–99)
GLUCOSE SERPL-MCNC: 170 MG/DL — HIGH (ref 70–99)
HCT VFR BLD CALC: 31.7 % — LOW (ref 39–50)
HGB BLD-MCNC: 10.7 G/DL — LOW (ref 13–17)
INR BLD: 2.86 RATIO — HIGH (ref 0.85–1.18)
MAGNESIUM SERPL-MCNC: 1.9 MG/DL — SIGNIFICANT CHANGE UP (ref 1.6–2.6)
MCHC RBC-ENTMCNC: 29.8 PG — SIGNIFICANT CHANGE UP (ref 27–34)
MCHC RBC-ENTMCNC: 33.8 GM/DL — SIGNIFICANT CHANGE UP (ref 32–36)
MCV RBC AUTO: 88.3 FL — SIGNIFICANT CHANGE UP (ref 80–100)
NRBC # BLD: 0 /100 WBCS — SIGNIFICANT CHANGE UP (ref 0–0)
PHOSPHATE SERPL-MCNC: 2.6 MG/DL — SIGNIFICANT CHANGE UP (ref 2.5–4.5)
PLATELET # BLD AUTO: 135 K/UL — LOW (ref 150–400)
POTASSIUM SERPL-MCNC: 4.2 MMOL/L — SIGNIFICANT CHANGE UP (ref 3.5–5.3)
POTASSIUM SERPL-SCNC: 4.2 MMOL/L — SIGNIFICANT CHANGE UP (ref 3.5–5.3)
PROTHROM AB SERPL-ACNC: 30.5 SEC — HIGH (ref 9.5–13)
RBC # BLD: 3.59 M/UL — LOW (ref 4.2–5.8)
RBC # FLD: 13.9 % — SIGNIFICANT CHANGE UP (ref 10.3–14.5)
RH IG SCN BLD-IMP: POSITIVE — SIGNIFICANT CHANGE UP
SODIUM SERPL-SCNC: 135 MMOL/L — SIGNIFICANT CHANGE UP (ref 135–145)
WBC # BLD: 10.09 K/UL — SIGNIFICANT CHANGE UP (ref 3.8–10.5)
WBC # FLD AUTO: 10.09 K/UL — SIGNIFICANT CHANGE UP (ref 3.8–10.5)

## 2024-06-14 PROCEDURE — 93010 ELECTROCARDIOGRAM REPORT: CPT | Mod: 77

## 2024-06-14 PROCEDURE — 93306 TTE W/DOPPLER COMPLETE: CPT | Mod: 26

## 2024-06-14 PROCEDURE — 99233 SBSQ HOSP IP/OBS HIGH 50: CPT | Mod: GC

## 2024-06-14 PROCEDURE — 93010 ELECTROCARDIOGRAM REPORT: CPT

## 2024-06-14 RX ORDER — POLYETHYLENE GLYCOL 3350 1 G/G
17 POWDER ORAL
Refills: 0 | Status: DISCONTINUED | OUTPATIENT
Start: 2024-06-14 | End: 2024-06-16

## 2024-06-14 RX ORDER — BISACODYL 5 MG
10 TABLET, DELAYED RELEASE (ENTERIC COATED) ORAL ONCE
Refills: 0 | Status: COMPLETED | OUTPATIENT
Start: 2024-06-14 | End: 2024-06-14

## 2024-06-14 RX ORDER — FUROSEMIDE 10 MG/ML
20 INJECTION, SOLUTION INTRAMUSCULAR; INTRAVENOUS DAILY
Refills: 0 | Status: DISCONTINUED | OUTPATIENT
Start: 2024-06-14 | End: 2024-06-16

## 2024-06-14 RX ORDER — SOTALOL HYDROCHLORIDE 80 MG/1
120 TABLET ORAL
Refills: 0 | Status: DISCONTINUED | OUTPATIENT
Start: 2024-06-14 | End: 2024-06-16

## 2024-06-14 RX ORDER — WARFARIN SODIUM 4 MG/1
7.5 TABLET ORAL ONCE
Refills: 0 | Status: DISCONTINUED | OUTPATIENT
Start: 2024-06-14 | End: 2024-06-14

## 2024-06-14 RX ORDER — WARFARIN SODIUM 4 MG/1
4 TABLET ORAL ONCE
Refills: 0 | Status: COMPLETED | OUTPATIENT
Start: 2024-06-14 | End: 2024-06-14

## 2024-06-14 RX ADMIN — WARFARIN SODIUM 4 MILLIGRAM(S): 4 TABLET ORAL at 21:37

## 2024-06-14 RX ADMIN — FUROSEMIDE 20 MILLIGRAM(S): 10 INJECTION, SOLUTION INTRAMUSCULAR; INTRAVENOUS at 17:16

## 2024-06-14 RX ADMIN — SOTALOL HYDROCHLORIDE 120 MILLIGRAM(S): 80 TABLET ORAL at 12:24

## 2024-06-14 RX ADMIN — ASPIRIN 81 MILLIGRAM(S): 325 TABLET, FILM COATED ORAL at 12:24

## 2024-06-14 RX ADMIN — INSULIN LISPRO 1: 100 INJECTION, SOLUTION SUBCUTANEOUS at 12:50

## 2024-06-14 RX ADMIN — INSULIN LISPRO 1: 100 INJECTION, SOLUTION SUBCUTANEOUS at 06:36

## 2024-06-14 RX ADMIN — Medication 2 TABLET(S): at 21:37

## 2024-06-14 RX ADMIN — POLYETHYLENE GLYCOL 3350 17 GRAM(S): 1 POWDER ORAL at 05:06

## 2024-06-14 RX ADMIN — SOTALOL HYDROCHLORIDE 120 MILLIGRAM(S): 80 TABLET ORAL at 23:05

## 2024-06-14 RX ADMIN — INSULIN LISPRO 1: 100 INJECTION, SOLUTION SUBCUTANEOUS at 17:17

## 2024-06-14 RX ADMIN — POLYETHYLENE GLYCOL 3350 17 GRAM(S): 1 POWDER ORAL at 12:53

## 2024-06-14 RX ADMIN — PANTOPRAZOLE SODIUM 40 MILLIGRAM(S): 40 INJECTION, POWDER, FOR SOLUTION INTRAVENOUS at 05:06

## 2024-06-14 RX ADMIN — ATORVASTATIN CALCIUM 80 MILLIGRAM(S): 20 TABLET, FILM COATED ORAL at 21:37

## 2024-06-14 RX ADMIN — Medication 3 MILLIGRAM(S): at 21:37

## 2024-06-14 RX ADMIN — INSULIN LISPRO 1: 100 INJECTION, SOLUTION SUBCUTANEOUS at 00:47

## 2024-06-15 LAB
ANION GAP SERPL CALC-SCNC: 12 MMOL/L — SIGNIFICANT CHANGE UP (ref 5–17)
BUN SERPL-MCNC: 19 MG/DL — SIGNIFICANT CHANGE UP (ref 7–23)
CALCIUM SERPL-MCNC: 9 MG/DL — SIGNIFICANT CHANGE UP (ref 8.4–10.5)
CHLORIDE SERPL-SCNC: 100 MMOL/L — SIGNIFICANT CHANGE UP (ref 96–108)
CO2 SERPL-SCNC: 21 MMOL/L — LOW (ref 22–31)
CREAT SERPL-MCNC: 0.97 MG/DL — SIGNIFICANT CHANGE UP (ref 0.5–1.3)
EGFR: 94 ML/MIN/1.73M2 — SIGNIFICANT CHANGE UP
GLUCOSE BLDC GLUCOMTR-MCNC: 150 MG/DL — HIGH (ref 70–99)
GLUCOSE BLDC GLUCOMTR-MCNC: 157 MG/DL — HIGH (ref 70–99)
GLUCOSE BLDC GLUCOMTR-MCNC: 184 MG/DL — HIGH (ref 70–99)
GLUCOSE BLDC GLUCOMTR-MCNC: 228 MG/DL — HIGH (ref 70–99)
GLUCOSE SERPL-MCNC: 152 MG/DL — HIGH (ref 70–99)
HCT VFR BLD CALC: 31 % — LOW (ref 39–50)
HGB BLD-MCNC: 10.4 G/DL — LOW (ref 13–17)
MAGNESIUM SERPL-MCNC: 1.8 MG/DL — SIGNIFICANT CHANGE UP (ref 1.6–2.6)
MCHC RBC-ENTMCNC: 29.7 PG — SIGNIFICANT CHANGE UP (ref 27–34)
MCHC RBC-ENTMCNC: 33.5 GM/DL — SIGNIFICANT CHANGE UP (ref 32–36)
MCV RBC AUTO: 88.6 FL — SIGNIFICANT CHANGE UP (ref 80–100)
NRBC # BLD: 0 /100 WBCS — SIGNIFICANT CHANGE UP (ref 0–0)
PHOSPHATE SERPL-MCNC: 2.7 MG/DL — SIGNIFICANT CHANGE UP (ref 2.5–4.5)
PLATELET # BLD AUTO: 162 K/UL — SIGNIFICANT CHANGE UP (ref 150–400)
POTASSIUM SERPL-MCNC: 3.7 MMOL/L — SIGNIFICANT CHANGE UP (ref 3.5–5.3)
POTASSIUM SERPL-SCNC: 3.7 MMOL/L — SIGNIFICANT CHANGE UP (ref 3.5–5.3)
RBC # BLD: 3.5 M/UL — LOW (ref 4.2–5.8)
RBC # FLD: 13.8 % — SIGNIFICANT CHANGE UP (ref 10.3–14.5)
SODIUM SERPL-SCNC: 133 MMOL/L — LOW (ref 135–145)
WBC # BLD: 8.79 K/UL — SIGNIFICANT CHANGE UP (ref 3.8–10.5)
WBC # FLD AUTO: 8.79 K/UL — SIGNIFICANT CHANGE UP (ref 3.8–10.5)

## 2024-06-15 PROCEDURE — 93010 ELECTROCARDIOGRAM REPORT: CPT

## 2024-06-15 PROCEDURE — 93010 ELECTROCARDIOGRAM REPORT: CPT | Mod: 77

## 2024-06-15 PROCEDURE — 99231 SBSQ HOSP IP/OBS SF/LOW 25: CPT

## 2024-06-15 PROCEDURE — 99232 SBSQ HOSP IP/OBS MODERATE 35: CPT | Mod: GC

## 2024-06-15 RX ORDER — FUROSEMIDE 10 MG/ML
20 INJECTION, SOLUTION INTRAMUSCULAR; INTRAVENOUS DAILY
Refills: 0 | Status: DISCONTINUED | OUTPATIENT
Start: 2024-06-15 | End: 2024-06-16

## 2024-06-15 RX ADMIN — INSULIN LISPRO 2: 100 INJECTION, SOLUTION SUBCUTANEOUS at 12:20

## 2024-06-15 RX ADMIN — ASPIRIN 81 MILLIGRAM(S): 325 TABLET, FILM COATED ORAL at 12:05

## 2024-06-15 RX ADMIN — INSULIN LISPRO 1: 100 INJECTION, SOLUTION SUBCUTANEOUS at 17:31

## 2024-06-15 RX ADMIN — ATORVASTATIN CALCIUM 80 MILLIGRAM(S): 20 TABLET, FILM COATED ORAL at 21:37

## 2024-06-15 RX ADMIN — FUROSEMIDE 20 MILLIGRAM(S): 10 INJECTION, SOLUTION INTRAMUSCULAR; INTRAVENOUS at 05:52

## 2024-06-15 RX ADMIN — Medication 3 MILLIGRAM(S): at 21:37

## 2024-06-15 RX ADMIN — INSULIN LISPRO 1: 100 INJECTION, SOLUTION SUBCUTANEOUS at 01:50

## 2024-06-15 RX ADMIN — SOTALOL HYDROCHLORIDE 120 MILLIGRAM(S): 80 TABLET ORAL at 11:54

## 2024-06-15 RX ADMIN — SOTALOL HYDROCHLORIDE 120 MILLIGRAM(S): 80 TABLET ORAL at 23:19

## 2024-06-16 ENCOUNTER — NON-APPOINTMENT (OUTPATIENT)
Age: 54
End: 2024-06-16

## 2024-06-16 ENCOUNTER — TRANSCRIPTION ENCOUNTER (OUTPATIENT)
Age: 54
End: 2024-06-16

## 2024-06-16 VITALS — HEART RATE: 70 BPM | SYSTOLIC BLOOD PRESSURE: 145 MMHG | DIASTOLIC BLOOD PRESSURE: 87 MMHG

## 2024-06-16 DIAGNOSIS — Z98.890 OTHER SPECIFIED POSTPROCEDURAL STATES: ICD-10-CM

## 2024-06-16 LAB
ANION GAP SERPL CALC-SCNC: 11 MMOL/L — SIGNIFICANT CHANGE UP (ref 5–17)
APTT BLD: 31.1 SEC — SIGNIFICANT CHANGE UP (ref 24.5–35.6)
BUN SERPL-MCNC: 17 MG/DL — SIGNIFICANT CHANGE UP (ref 7–23)
CALCIUM SERPL-MCNC: 9.1 MG/DL — SIGNIFICANT CHANGE UP (ref 8.4–10.5)
CHLORIDE SERPL-SCNC: 103 MMOL/L — SIGNIFICANT CHANGE UP (ref 96–108)
CO2 SERPL-SCNC: 21 MMOL/L — LOW (ref 22–31)
CREAT SERPL-MCNC: 0.91 MG/DL — SIGNIFICANT CHANGE UP (ref 0.5–1.3)
EGFR: 101 ML/MIN/1.73M2 — SIGNIFICANT CHANGE UP
GLUCOSE BLDC GLUCOMTR-MCNC: 158 MG/DL — HIGH (ref 70–99)
GLUCOSE BLDC GLUCOMTR-MCNC: 210 MG/DL — HIGH (ref 70–99)
GLUCOSE BLDC GLUCOMTR-MCNC: 222 MG/DL — HIGH (ref 70–99)
GLUCOSE SERPL-MCNC: 206 MG/DL — HIGH (ref 70–99)
HCT VFR BLD CALC: 31.4 % — LOW (ref 39–50)
HGB BLD-MCNC: 10.7 G/DL — LOW (ref 13–17)
INR BLD: 1.82 RATIO — HIGH (ref 0.85–1.18)
MAGNESIUM SERPL-MCNC: 1.7 MG/DL — SIGNIFICANT CHANGE UP (ref 1.6–2.6)
MCHC RBC-ENTMCNC: 29.6 PG — SIGNIFICANT CHANGE UP (ref 27–34)
MCHC RBC-ENTMCNC: 34.1 GM/DL — SIGNIFICANT CHANGE UP (ref 32–36)
MCV RBC AUTO: 86.7 FL — SIGNIFICANT CHANGE UP (ref 80–100)
NRBC # BLD: 0 /100 WBCS — SIGNIFICANT CHANGE UP (ref 0–0)
PHOSPHATE SERPL-MCNC: 3.1 MG/DL — SIGNIFICANT CHANGE UP (ref 2.5–4.5)
PLATELET # BLD AUTO: 165 K/UL — SIGNIFICANT CHANGE UP (ref 150–400)
POTASSIUM SERPL-MCNC: 4 MMOL/L — SIGNIFICANT CHANGE UP (ref 3.5–5.3)
POTASSIUM SERPL-SCNC: 4 MMOL/L — SIGNIFICANT CHANGE UP (ref 3.5–5.3)
PROTHROM AB SERPL-ACNC: 18.8 SEC — HIGH (ref 9.5–13)
RBC # BLD: 3.62 M/UL — LOW (ref 4.2–5.8)
RBC # FLD: 13.3 % — SIGNIFICANT CHANGE UP (ref 10.3–14.5)
SODIUM SERPL-SCNC: 135 MMOL/L — SIGNIFICANT CHANGE UP (ref 135–145)
WBC # BLD: 7.87 K/UL — SIGNIFICANT CHANGE UP (ref 3.8–10.5)
WBC # FLD AUTO: 7.87 K/UL — SIGNIFICANT CHANGE UP (ref 3.8–10.5)

## 2024-06-16 PROCEDURE — 85730 THROMBOPLASTIN TIME PARTIAL: CPT

## 2024-06-16 PROCEDURE — 71045 X-RAY EXAM CHEST 1 VIEW: CPT

## 2024-06-16 PROCEDURE — 83036 HEMOGLOBIN GLYCOSYLATED A1C: CPT

## 2024-06-16 PROCEDURE — 80048 BASIC METABOLIC PNL TOTAL CA: CPT

## 2024-06-16 PROCEDURE — 80053 COMPREHEN METABOLIC PANEL: CPT

## 2024-06-16 PROCEDURE — 87637 SARSCOV2&INF A&B&RSV AMP PRB: CPT

## 2024-06-16 PROCEDURE — 84484 ASSAY OF TROPONIN QUANT: CPT

## 2024-06-16 PROCEDURE — 93005 ELECTROCARDIOGRAM TRACING: CPT | Mod: 76

## 2024-06-16 PROCEDURE — 86900 BLOOD TYPING SEROLOGIC ABO: CPT

## 2024-06-16 PROCEDURE — 99291 CRITICAL CARE FIRST HOUR: CPT | Mod: 25

## 2024-06-16 PROCEDURE — 82962 GLUCOSE BLOOD TEST: CPT

## 2024-06-16 PROCEDURE — 85610 PROTHROMBIN TIME: CPT

## 2024-06-16 PROCEDURE — 84439 ASSAY OF FREE THYROXINE: CPT

## 2024-06-16 PROCEDURE — 99232 SBSQ HOSP IP/OBS MODERATE 35: CPT

## 2024-06-16 PROCEDURE — 83880 ASSAY OF NATRIURETIC PEPTIDE: CPT

## 2024-06-16 PROCEDURE — 86901 BLOOD TYPING SEROLOGIC RH(D): CPT

## 2024-06-16 PROCEDURE — 85027 COMPLETE CBC AUTOMATED: CPT

## 2024-06-16 PROCEDURE — 93010 ELECTROCARDIOGRAM REPORT: CPT

## 2024-06-16 PROCEDURE — 36415 COLL VENOUS BLD VENIPUNCTURE: CPT

## 2024-06-16 PROCEDURE — 84443 ASSAY THYROID STIM HORMONE: CPT

## 2024-06-16 PROCEDURE — 86850 RBC ANTIBODY SCREEN: CPT

## 2024-06-16 PROCEDURE — 80061 LIPID PANEL: CPT

## 2024-06-16 PROCEDURE — 96374 THER/PROPH/DIAG INJ IV PUSH: CPT

## 2024-06-16 PROCEDURE — 83690 ASSAY OF LIPASE: CPT

## 2024-06-16 PROCEDURE — 84100 ASSAY OF PHOSPHORUS: CPT

## 2024-06-16 PROCEDURE — 83735 ASSAY OF MAGNESIUM: CPT

## 2024-06-16 PROCEDURE — C8929: CPT

## 2024-06-16 PROCEDURE — 96375 TX/PRO/DX INJ NEW DRUG ADDON: CPT

## 2024-06-16 PROCEDURE — 85025 COMPLETE CBC W/AUTO DIFF WBC: CPT

## 2024-06-16 RX ORDER — MAGNESIUM SULFATE 100 %
2 POWDER (GRAM) MISCELLANEOUS ONCE
Refills: 0 | Status: COMPLETED | OUTPATIENT
Start: 2024-06-16 | End: 2024-06-16

## 2024-06-16 RX ORDER — POTASSIUM CHLORIDE 600 MG/1
1 TABLET, FILM COATED, EXTENDED RELEASE ORAL
Qty: 30 | Refills: 0
Start: 2024-06-16 | End: 2024-07-15

## 2024-06-16 RX ORDER — SOTALOL HYDROCHLORIDE 80 MG/1
1 TABLET ORAL
Qty: 60 | Refills: 0
Start: 2024-06-16 | End: 2024-07-15

## 2024-06-16 RX ORDER — FUROSEMIDE 10 MG/ML
1 INJECTION, SOLUTION INTRAMUSCULAR; INTRAVENOUS
Qty: 30 | Refills: 0
Start: 2024-06-16 | End: 2024-07-15

## 2024-06-16 RX ORDER — PANTOPRAZOLE SODIUM 20 MG/1
1 TABLET, DELAYED RELEASE ORAL
Refills: 0 | DISCHARGE

## 2024-06-16 RX ORDER — MAGNESIUM CHLORIDE 70 MG
2 TABLET, DELAYED RELEASE (ENTERIC COATED) ORAL
Qty: 60 | Refills: 0
Start: 2024-06-16 | End: 2024-07-15

## 2024-06-16 RX ADMIN — FUROSEMIDE 20 MILLIGRAM(S): 10 INJECTION, SOLUTION INTRAMUSCULAR; INTRAVENOUS at 05:28

## 2024-06-16 RX ADMIN — ASPIRIN 81 MILLIGRAM(S): 325 TABLET, FILM COATED ORAL at 11:21

## 2024-06-16 RX ADMIN — INSULIN LISPRO 2: 100 INJECTION, SOLUTION SUBCUTANEOUS at 06:57

## 2024-06-16 RX ADMIN — SOTALOL HYDROCHLORIDE 120 MILLIGRAM(S): 80 TABLET ORAL at 11:21

## 2024-06-16 RX ADMIN — INSULIN LISPRO 2: 100 INJECTION, SOLUTION SUBCUTANEOUS at 11:24

## 2024-06-16 RX ADMIN — INSULIN LISPRO 1: 100 INJECTION, SOLUTION SUBCUTANEOUS at 02:00

## 2024-06-16 RX ADMIN — Medication 25 GRAM(S): at 11:21

## 2024-06-19 ENCOUNTER — APPOINTMENT (OUTPATIENT)
Dept: ELECTROPHYSIOLOGY | Facility: CLINIC | Age: 54
End: 2024-06-19
Payer: MEDICAID

## 2024-06-19 VITALS
WEIGHT: 238.1 LBS | SYSTOLIC BLOOD PRESSURE: 138 MMHG | OXYGEN SATURATION: 99 % | HEART RATE: 78 BPM | HEIGHT: 68 IN | BODY MASS INDEX: 36.09 KG/M2 | DIASTOLIC BLOOD PRESSURE: 90 MMHG

## 2024-06-19 DIAGNOSIS — I48.92 UNSPECIFIED ATRIAL FLUTTER: ICD-10-CM

## 2024-06-19 DIAGNOSIS — I48.91 UNSPECIFIED ATRIAL FIBRILLATION: ICD-10-CM

## 2024-06-19 DIAGNOSIS — Z95.3 PRESENCE OF XENOGENIC HEART VALVE: ICD-10-CM

## 2024-06-19 PROCEDURE — 93000 ELECTROCARDIOGRAM COMPLETE: CPT

## 2024-06-19 PROCEDURE — 99214 OFFICE O/P EST MOD 30 MIN: CPT | Mod: 25

## 2024-06-19 RX ORDER — MAGNESIUM CHLORIDE 64 MG
64 TABLET, DELAYED RELEASE (ENTERIC COATED) ORAL
Refills: 0 | Status: ACTIVE | COMMUNITY

## 2024-06-19 RX ORDER — SOTALOL HYDROCHLORIDE TABLES AF 120 MG/1
120 TABLET ORAL
Qty: 180 | Refills: 3 | Status: ACTIVE | COMMUNITY

## 2024-06-19 RX ORDER — FUROSEMIDE 20 MG/1
20 TABLET ORAL DAILY
Qty: 90 | Refills: 3 | Status: ACTIVE | COMMUNITY

## 2024-06-20 PROBLEM — I48.92 ATRIAL FLUTTER: Status: ACTIVE | Noted: 2017-09-26

## 2024-06-20 PROBLEM — Z95.3 HISTORY OF MITRAL VALVE REPLACEMENT WITH BIOPROSTHETIC VALVE: Status: ACTIVE | Noted: 2017-09-26

## 2024-06-20 NOTE — CARDIOLOGY SUMMARY
[de-identified] : today:  @ 21bpm [de-identified] : 6/14/24: EF 64%; mild-mod pHTN, mildly dilated LA; mild-mod TR; bio AVR/MVR

## 2024-06-20 NOTE — HISTORY OF PRESENT ILLNESS
[FreeTextEntry1] : I had the pleasure of seeing Mr. Zelaya today for follow-up in the arrhythmia clinic at [facility]. As you well know, he is a pleasant 53-year-old gentleman with a history of severe mitral regurgitation and aortic insufficiency s/p bio AVR/MVR in 2015, atrial flutter s/p ablation 2018, diabetes, hyperlipidemia and pheochromocytoma s/p adrenalectomy.  In late February 2024, he was found to be in symptomatic atrial flutter and ultimately brought for EP study, during which he was found to be in counterclockwise mitral flutter.  Pulmonary vein isolation and an anterior mitral isthmus line were created for termination of the arrhythmia back to sinus rhythm.  Durable CTI block was observed. He had done well up until last week when he presented to [facility] with symptomatic recurrent atrial flutter which eventually degenerated to atrial fibrillation with rapid ventricular rates and ultimately spontaneously converted to sinus rhythm.  Patient was started on sotalol with stable QT and discharged home.   Today in clinic, the patient reports feeling well.  He denies any chest pain, shortness of breath, palpitations, near-syncope, or syncope.  He has been compliant on his medications including his sotalol.

## 2024-06-20 NOTE — DISCUSSION/SUMMARY
[EKG obtained to assist in diagnosis and management of assessed problem(s)] : EKG obtained to assist in diagnosis and management of assessed problem(s) [FreeTextEntry1] : In summary, this is a 53-year-old man with mitral annular flutter status post ablation in 2/2024 who was admitted last week with recurrent atrial flutter that degenerated to atrial fibrillation prior to spontaneously converting back to SR. He is now maintained on Sotalol.  We will continue current regimen.  If his atrial arrhythmias recur, we will consider a repeat ablation at that time.  He will follow-up in 6 months.  He appeared to understand the whole discussion and verbalized that all of his questions were answered to his satisfaction.  Thank you for allowing me to be involved in the care of this pleasant man. Please feel free to contact me with any questions.    Marcell Choe MD  of Cardiology Electrophysiology Section [address] [place] [address], [address] [place] [address] Office: [phone] Fax: [phone]

## 2024-06-20 NOTE — END OF VISIT
[FreeTextEntry3] : I, Dr. Marcell Choe, personally performed the evaluation and management (E/M) services for this established patient who presents today with (a) new problem(s)/exacerbation of (an) existing condition(s).  That E/M includes conducting the examination, assessing all new/exacerbated conditions, and establishing a new plan of care.  Today my PA, Shannon Lopez, was here to observe my evaluation and management services for this new problem/exacerbated condition to be followed going forward. [Time Spent: ___ minutes] : I have spent [unfilled] minutes of time on the encounter.

## 2024-06-20 NOTE — PHYSICAL EXAM
[Well Nourished] : well nourished [No Acute Distress] : no acute distress [Normal Conjunctiva] : normal conjunctiva [Normal Venous Pressure] : normal venous pressure [Normal S1, S2] : normal S1, S2 [No Rub] : no rub [No Gallop] : no gallop [Clear Lung Fields] : clear lung fields [Good Air Entry] : good air entry [No Respiratory Distress] : no respiratory distress  [Soft] : abdomen soft [Non Tender] : non-tender [Normal Bowel Sounds] : normal bowel sounds [Normal Gait] : normal gait [No Edema] : no edema [No Cyanosis] : no cyanosis [No Rash] : no rash [Moves all extremities] : moves all extremities [No Focal Deficits] : no focal deficits [Normal Speech] : normal speech [Alert and Oriented] : alert and oriented [Normal memory] : normal memory [Obese] : obese [Murmur] : murmur [de-identified] : II/VI SM

## 2024-07-23 ENCOUNTER — APPOINTMENT (OUTPATIENT)
Dept: CARDIOLOGY | Facility: HOSPITAL | Age: 54
End: 2024-07-23

## 2024-07-23 ENCOUNTER — NON-APPOINTMENT (OUTPATIENT)
Age: 54
End: 2024-07-23

## 2024-07-23 VITALS
WEIGHT: 240 LBS | BODY MASS INDEX: 36.37 KG/M2 | OXYGEN SATURATION: 98 % | SYSTOLIC BLOOD PRESSURE: 153 MMHG | HEART RATE: 74 BPM | HEIGHT: 68 IN | DIASTOLIC BLOOD PRESSURE: 87 MMHG

## 2024-07-23 DIAGNOSIS — I50.23 ACUTE ON CHRONIC SYSTOLIC (CONGESTIVE) HEART FAILURE: ICD-10-CM

## 2024-07-23 RX ORDER — POTASSIUM CHLORIDE 1500 MG/1
20 TABLET, FILM COATED, EXTENDED RELEASE ORAL DAILY
Qty: 90 | Refills: 0 | Status: ACTIVE | COMMUNITY
Start: 2024-07-23 | End: 1900-01-01

## 2024-07-23 NOTE — PHYSICAL EXAM

## 2024-07-23 NOTE — PHYSICAL EXAM

## 2024-07-23 NOTE — REASON FOR VISIT
[FreeTextEntry1] : 54 yo M with a PMH significant for severe MR and AI s/p bioprosthetic AVR, MVR in 9/2015, atrial flutter s/p ablation(1/2018), IDDM, HLD, HCV, pheochromocytoma s/p adrenalectomy who presents for follow up.  LCV 11/23: Today notes he is doing well. Had labs with PCP which showed his A1c is 7.7 from 7.5, INR has been 1.7-3.4, CBC CMP TSH PSA unremarkable. Needs HBV vaccine. Otherwise doing well, denies FC NV CP SOB. He has been actively trying to lose weight, lost 20 lbs, eating less walking 30-40 min. Has not been checking BP at home but has a machine. PCP to order meds as needed.  IE: Admitted for SVT found to have AF RVR 6/12-6/16, cardioverted with sotalol 120 BID, sent home on sotalol and lasix 20 QDay, saw Dr. Choe after, plan to continue meds and consider ablation if AF recurs  Today patient reports he is doing well after his recent discharge. He has not had any CP palpitations dizziness or syncope. He has been compliant with his coumadin and labs show INR 1.3-3.2 but he states the last 3 checks were at goal 2-3. He has not had any bleeding or bruising. He can walk 1 mile without stopping and exercises reuglarly. He thinks he has gained 20 lbs since his last visit and is interested in a GLP1 agonist, however he has evidence of mild hypervolemia on exam today. Will increase lasix to 40 QDay. We spoke about his AFib/flutter and will refill his sotalol and electrolyte meds today.

## 2024-07-23 NOTE — ASSESSMENT
[FreeTextEntry1] : 52 yo M with a PMH significant for severe MR and AI s/p bioprosthetic AVR, MVR in 9/2015, atrial flutter s/p ablation (1/2018), IDDM, HLD, HCV, pheochromocytoma s/p adrenalectomy who presents for follow up.  #HLD - Last LDL 58 in 7/2023 - Cont home lipitor and zetia  #HTN - Home BP 130s per patient  - Increase lasix to 40 QDay  - Prev on Chlorthalidone 25 Enalipril 5 and MTP Succ 100 QDay - Will F/U BP at next visit, consider restarting ACE I  #Hx of Bio MVR and AVR - Aspirin for bioprosthetic MVR - Cont Metoprolol Succ 100 QDay - Last TTE shows normal EF and well functioning prosthesis   #AFib/Flutter - S/p AFlutter ablation - S/p Sotalol AFib cardioversion - Cont Sotalol 120 BID and Coumadin 7.5 QDay, Goal INR 2-3  #Overweight - Discussed healthy eating strategies with more fruits/vegetables/lean meats and less bread - Consider tirzepatide in the future   RTC 1 mth for BP log and vol status   Colton Salas PGY6 Cardiology Fellow  HUSSAIN Mosley.

## 2024-07-23 NOTE — ASSESSMENT
[FreeTextEntry1] : 54 yo M with a PMH significant for severe MR and AI s/p bioprosthetic AVR, MVR in 9/2015, atrial flutter s/p ablation (1/2018), IDDM, HLD, HCV, pheochromocytoma s/p adrenalectomy who presents for follow up.  #HLD - Last LDL 58 in 7/2023 - Cont home lipitor and zetia  #HTN - Home BP 130s per patient  - Increase lasix to 40 QDay  - Prev on Chlorthalidone 25 Enalipril 5 and MTP Succ 100 QDay - Will F/U BP at next visit, consider restarting ACE I  #Hx of Bio MVR and AVR - Aspirin for bioprosthetic MVR - Cont Metoprolol Succ 100 QDay - Last TTE shows normal EF and well functioning prosthesis   #AFib/Flutter - S/p AFlutter ablation - S/p Sotalol AFib cardioversion - Cont Sotalol 120 BID and Coumadin 7.5 QDay, Goal INR 2-3  #Overweight - Discussed healthy eating strategies with more fruits/vegetables/lean meats and less bread - Consider tirzepatide in the future   RTC 1 mth for BP log and vol status   Colton Salas PGY6 Cardiology Fellow  HUSSAIN Mosley.

## 2024-08-27 ENCOUNTER — APPOINTMENT (OUTPATIENT)
Dept: CARDIOLOGY | Facility: HOSPITAL | Age: 54
End: 2024-08-27

## 2024-08-27 ENCOUNTER — NON-APPOINTMENT (OUTPATIENT)
Age: 54
End: 2024-08-27

## 2024-08-27 ENCOUNTER — OUTPATIENT (OUTPATIENT)
Dept: OUTPATIENT SERVICES | Facility: HOSPITAL | Age: 54
LOS: 1 days | End: 2024-08-27
Payer: MEDICAID

## 2024-08-27 VITALS
HEIGHT: 68 IN | WEIGHT: 243 LBS | OXYGEN SATURATION: 98 % | BODY MASS INDEX: 36.83 KG/M2 | DIASTOLIC BLOOD PRESSURE: 81 MMHG | HEART RATE: 68 BPM | SYSTOLIC BLOOD PRESSURE: 137 MMHG

## 2024-08-27 DIAGNOSIS — Z95.2 PRESENCE OF PROSTHETIC HEART VALVE: Chronic | ICD-10-CM

## 2024-08-27 DIAGNOSIS — Z86.018 PERSONAL HISTORY OF OTHER BENIGN NEOPLASM: Chronic | ICD-10-CM

## 2024-08-27 DIAGNOSIS — I50.23 ACUTE ON CHRONIC SYSTOLIC (CONGESTIVE) HEART FAILURE: ICD-10-CM

## 2024-08-27 DIAGNOSIS — Z98.89 OTHER SPECIFIED POSTPROCEDURAL STATES: Chronic | ICD-10-CM

## 2024-08-27 DIAGNOSIS — I25.10 ATHEROSCLEROTIC HEART DISEASE OF NATIVE CORONARY ARTERY WITHOUT ANGINA PECTORIS: ICD-10-CM

## 2024-08-27 PROCEDURE — 93005 ELECTROCARDIOGRAM TRACING: CPT

## 2024-08-27 PROCEDURE — G0463: CPT

## 2024-08-27 RX ORDER — LOSARTAN POTASSIUM 25 MG/1
25 TABLET, FILM COATED ORAL
Qty: 90 | Refills: 3 | Status: ACTIVE | COMMUNITY
Start: 2024-08-27 | End: 1900-01-01

## 2024-08-27 NOTE — REASON FOR VISIT
[FreeTextEntry1] : 54 yo M with a PMH significant for severe MR and AI s/p bioprosthetic AVR, MVR in 9/2015, atrial flutter s/p ablation(1/2018), IDDM, HLD, HCV, pheochromocytoma s/p adrenalectomy who presents for follow up.  [facility] 72/2024: He has not had any CP palpitations dizziness or syncope. He has been compliant with his coumadin and labs show INR 1.3-3.2 but he states the last 3 checks were at goal 2-3. He has not had any bleeding or bruising. He can walk 1 mile without stopping and exercises reuglarly. He thinks he has gained 20 lbs since his last visit and is interested in a GLP1 agonist, however he has evidence of mild hypervolemia on exam today. Will increase lasix to 40 QDay. We spoke about his AFib/flutter and will refill his sotalol and electrolyte meds today.  IE: Saw PMD, got labs  Today patient notes he is doing well. Wife and daughter came with him today for his appointment. He states that his edema has significantly improved since increasing his lasix to 40 QDay. He is somewhat irritated with having to go to the bathroom a lot since he works as a  but he overall feels better and can manage it as is. Pt also states that his PMD who is managing his INR would like to change his frequency of checks to 15 days instead of Q7. His INR has been 2.5-4.1 on the paper today and he acknowledges the risks of bleeding/clotting and would like to try the longer checks for convenience purposes. Review of his CBC CMP A1c TSH hepatitis Lipid panels are all grossly unremarkable aside from A1c of 7.0. He also notes that he has not had any further palpitations since his ablation. He would like to travel to [place] for 1 month, asked if he is safe to fly. I told him if he is feeling well and has enough meds I have no problem with it. Finally we reviewed his BP log which was mainly 110-150s/70-80s/ Will plan to start losartan 25 QDay today for BP control and renal protection as a diabetic.

## 2024-08-27 NOTE — REASON FOR VISIT
[FreeTextEntry1] : 52 yo M with a PMH significant for severe MR and AI s/p bioprosthetic AVR, MVR in 9/2015, atrial flutter s/p ablation(1/2018), IDDM, HLD, HCV, pheochromocytoma s/p adrenalectomy who presents for follow up.  [facility] 72/2024: He has not had any CP palpitations dizziness or syncope. He has been compliant with his coumadin and labs show INR 1.3-3.2 but he states the last 3 checks were at goal 2-3. He has not had any bleeding or bruising. He can walk 1 mile without stopping and exercises reuglarly. He thinks he has gained 20 lbs since his last visit and is interested in a GLP1 agonist, however he has evidence of mild hypervolemia on exam today. Will increase lasix to 40 QDay. We spoke about his AFib/flutter and will refill his sotalol and electrolyte meds today.  IE: Saw PMD, got labs  Today patient notes he is doing well. Wife and daughter came with him today for his appointment. He states that his edema has significantly improved since increasing his lasix to 40 QDay. He is somewhat irritated with having to go to the bathroom a lot since he works as a  but he overall feels better and can manage it as is. Pt also states that his PMD who is managing his INR would like to change his frequency of checks to 15 days instead of Q7. His INR has been 2.5-4.1 on the paper today and he acknowledges the risks of bleeding/clotting and would like to try the longer checks for convenience purposes. Review of his CBC CMP A1c TSH hepatitis Lipid panels are all grossly unremarkable aside from A1c of 7.0. He also notes that he has not had any further palpitations since his ablation. He would like to travel to [place] for 1 month, asked if he is safe to fly. I told him if he is feeling well and has enough meds I have no problem with it. Finally we reviewed his BP log which was mainly 110-150s/70-80s/ Will plan to start losartan 25 QDay today for BP control and renal protection as a diabetic.

## 2024-08-27 NOTE — ASSESSMENT
[FreeTextEntry1] : 52 yo M with a PMH significant for severe MR and AI s/p bioprosthetic AVR, MVR in 9/2015, atrial flutter s/p ablation (1/2018), IDDM, HLD, HCV, pheochromocytoma s/p adrenalectomy who presents for follow up.  #HLD - Last LDL controlled 60s 7/2024 - Cont home lipitor and zetia  #HTN - Home -150ss per patient - Prev on Chlorthalidone 25 Enalipril 5 and MTP Succ 100 QDay - Cont lasix 40 QDay - Start losartan 25 QDay (8/27) - F/U BMP 3 days after starting and F/U BP log at next visit   #Hx of Bio MVR and AVR - Aspirin for bioprosthetic MVR - Cont Metoprolol Succ 100 QDay - Last TTE shows normal EF and well functioning prosthesis  #AFib/Flutter - S/p AFlutter ablation - S/p Sotalol AFib cardioversion - Cont Sotalol 120 BID and Coumadin 7.5 QDay, Goal INR 2-3  > spacing out checks with PMD to Q2 weeks  #Overweight - Discussed healthy eating strategies with more fruits/vegetables/lean meats and less bread - Consider tirzepatide in the future  RTC 1 mth for BP log   Colton Salas PGY6 Cardiology Fellow  HUSSAIN Mosley.

## 2024-08-27 NOTE — ASSESSMENT
[FreeTextEntry1] : 54 yo M with a PMH significant for severe MR and AI s/p bioprosthetic AVR, MVR in 9/2015, atrial flutter s/p ablation (1/2018), IDDM, HLD, HCV, pheochromocytoma s/p adrenalectomy who presents for follow up.  #HLD - Last LDL controlled 60s 7/2024 - Cont home lipitor and zetia  #HTN - Home -150ss per patient - Prev on Chlorthalidone 25 Enalipril 5 and MTP Succ 100 QDay - Cont lasix 40 QDay - Start losartan 25 QDay (8/27) - F/U BMP 3 days after starting and F/U BP log at next visit   #Hx of Bio MVR and AVR - Aspirin for bioprosthetic MVR - Cont Metoprolol Succ 100 QDay - Last TTE shows normal EF and well functioning prosthesis  #AFib/Flutter - S/p AFlutter ablation - S/p Sotalol AFib cardioversion - Cont Sotalol 120 BID and Coumadin 7.5 QDay, Goal INR 2-3  > spacing out checks with PMD to Q2 weeks  #Overweight - Discussed healthy eating strategies with more fruits/vegetables/lean meats and less bread - Consider tirzepatide in the future  RTC 1 mth for BP log   Colton Salas PGY6 Cardiology Fellow  HUSSAIN Mosley.

## 2024-08-29 DIAGNOSIS — I50.23 ACUTE ON CHRONIC SYSTOLIC (CONGESTIVE) HEART FAILURE: ICD-10-CM

## 2024-09-24 ENCOUNTER — APPOINTMENT (OUTPATIENT)
Dept: CARDIOLOGY | Facility: HOSPITAL | Age: 54
End: 2024-09-24

## 2024-09-24 ENCOUNTER — NON-APPOINTMENT (OUTPATIENT)
Age: 54
End: 2024-09-24

## 2024-09-24 ENCOUNTER — OUTPATIENT (OUTPATIENT)
Dept: OUTPATIENT SERVICES | Facility: HOSPITAL | Age: 54
LOS: 1 days | End: 2024-09-24
Payer: MEDICAID

## 2024-09-24 VITALS
DIASTOLIC BLOOD PRESSURE: 68 MMHG | BODY MASS INDEX: 36.98 KG/M2 | SYSTOLIC BLOOD PRESSURE: 118 MMHG | HEIGHT: 68 IN | HEART RATE: 74 BPM | OXYGEN SATURATION: 99 % | WEIGHT: 244 LBS

## 2024-09-24 DIAGNOSIS — Z95.2 PRESENCE OF PROSTHETIC HEART VALVE: Chronic | ICD-10-CM

## 2024-09-24 DIAGNOSIS — I25.10 ATHEROSCLEROTIC HEART DISEASE OF NATIVE CORONARY ARTERY WITHOUT ANGINA PECTORIS: ICD-10-CM

## 2024-09-24 DIAGNOSIS — I50.23 ACUTE ON CHRONIC SYSTOLIC (CONGESTIVE) HEART FAILURE: ICD-10-CM

## 2024-09-24 PROCEDURE — G0463: CPT

## 2024-09-24 PROCEDURE — 93005 ELECTROCARDIOGRAM TRACING: CPT

## 2024-09-24 NOTE — REASON FOR VISIT
[FreeTextEntry1] : 53 yo M with a PMH significant for severe MR and AI s/p bioprosthetic AVR, MVR in 9/2015, atrial flutter s/p ablation(1/2018), IDDM, HLD, HCV, pheochromocytoma s/p adrenalectomy who presents for follow up.  LCV 8/2024: Doing well after increasing lasix 40 QDay, PMD managing INR, He also notes that he has not had any further palpitations since his ablation. BP log which was mainly 110-150s/70-80s/ Will plan to start losartan 25 QDay today for BP control and renal protection as a diabetic.  IE: BMP WNL on ARB  Today patient reports he is doing well today. He has stopped eating restaurant food and thinks he is feeling better. He spoke to his endocrinologist about GLP1 agonist and was told that it is not a good idea for him as his sugars were hard to control in the past. He notes he is going to try to eat less and walk more. BP log reviewed 110s-150s/70s but occasionally checking multiple times per day. Patient also still waiting to go to [facility], nervous about medical clearance for flying. Family asking for anxiolytics. Reassurance given, cleared to fly. Will increase losartan today to 50 QDay.

## 2024-09-24 NOTE — ASSESSMENT
[FreeTextEntry1] : 53 yo M with a PMH significant for severe MR and AI s/p bioprosthetic AVR, MVR in 9/2015, atrial flutter s/p ablation (1/2018), IDDM, HLD, HCV, pheochromocytoma s/p adrenalectomy who presents for follow up.  #HLD - Last LDL controlled 60s 7/2024 - Cont home lipitor and zetia  #HTN - Home -150ss per patient - Prev on Chlorthalidone 25 Enalipril 5 and MTP Succ 100 QDay - Cont lasix 40 QDay - Inc losartan 25 QDay to 50 QDay  #Hx of Bio MVR and AVR - Aspirin for bioprosthetic MVR - Cont Metoprolol Succ 100 QDay - Last TTE shows normal EF and well functioning prosthesis  #AFib/Flutter - S/p AFlutter ablation - S/p Sotalol AFib cardioversion - Cont Sotalol 120 BID and Coumadin 7.5 QDay, Goal INR 2-3  > spacing out checks with PMD to Q2 weeks  #Overweight - Discussed healthy eating strategies with more fruits/vegetables/lean meats and less bread - Consider tirzepatide in the future  RTC 3 mths   Colton Salas PGY6 Cardiology Fellow  HUSSAIN Lopez

## 2024-09-25 DIAGNOSIS — I50.23 ACUTE ON CHRONIC SYSTOLIC (CONGESTIVE) HEART FAILURE: ICD-10-CM

## 2024-09-27 ENCOUNTER — RX RENEWAL (OUTPATIENT)
Age: 54
End: 2024-09-27

## 2024-12-10 ENCOUNTER — OUTPATIENT (OUTPATIENT)
Dept: OUTPATIENT SERVICES | Facility: HOSPITAL | Age: 54
LOS: 1 days | End: 2024-12-10
Payer: MEDICAID

## 2024-12-10 ENCOUNTER — APPOINTMENT (OUTPATIENT)
Dept: CARDIOLOGY | Facility: HOSPITAL | Age: 54
End: 2024-12-10
Payer: MEDICAID

## 2024-12-10 ENCOUNTER — NON-APPOINTMENT (OUTPATIENT)
Age: 54
End: 2024-12-10

## 2024-12-10 VITALS
HEART RATE: 70 BPM | SYSTOLIC BLOOD PRESSURE: 139 MMHG | WEIGHT: 244 LBS | OXYGEN SATURATION: 99 % | DIASTOLIC BLOOD PRESSURE: 78 MMHG | BODY MASS INDEX: 37.1 KG/M2

## 2024-12-10 DIAGNOSIS — Z98.89 OTHER SPECIFIED POSTPROCEDURAL STATES: Chronic | ICD-10-CM

## 2024-12-10 DIAGNOSIS — Z95.2 PRESENCE OF PROSTHETIC HEART VALVE: Chronic | ICD-10-CM

## 2024-12-10 DIAGNOSIS — I50.23 ACUTE ON CHRONIC SYSTOLIC (CONGESTIVE) HEART FAILURE: ICD-10-CM

## 2024-12-10 DIAGNOSIS — Z86.018 PERSONAL HISTORY OF OTHER BENIGN NEOPLASM: Chronic | ICD-10-CM

## 2024-12-10 DIAGNOSIS — I25.10 ATHEROSCLEROTIC HEART DISEASE OF NATIVE CORONARY ARTERY WITHOUT ANGINA PECTORIS: ICD-10-CM

## 2024-12-10 PROCEDURE — 93005 ELECTROCARDIOGRAM TRACING: CPT

## 2024-12-10 PROCEDURE — G0463: CPT

## 2024-12-10 PROCEDURE — 99203 OFFICE O/P NEW LOW 30 MIN: CPT | Mod: 25

## 2024-12-10 PROCEDURE — 93000 ELECTROCARDIOGRAM COMPLETE: CPT

## 2024-12-11 DIAGNOSIS — I50.23 ACUTE ON CHRONIC SYSTOLIC (CONGESTIVE) HEART FAILURE: ICD-10-CM

## 2024-12-18 ENCOUNTER — APPOINTMENT (OUTPATIENT)
Dept: ELECTROPHYSIOLOGY | Facility: CLINIC | Age: 54
End: 2024-12-18
Payer: MEDICAID

## 2024-12-18 VITALS — HEART RATE: 76 BPM | DIASTOLIC BLOOD PRESSURE: 75 MMHG | SYSTOLIC BLOOD PRESSURE: 122 MMHG | OXYGEN SATURATION: 100 %

## 2024-12-18 DIAGNOSIS — I48.92 UNSPECIFIED ATRIAL FLUTTER: ICD-10-CM

## 2024-12-18 PROCEDURE — 93000 ELECTROCARDIOGRAM COMPLETE: CPT

## 2024-12-18 PROCEDURE — 99214 OFFICE O/P EST MOD 30 MIN: CPT | Mod: 25

## 2025-03-11 ENCOUNTER — APPOINTMENT (OUTPATIENT)
Dept: CARDIOLOGY | Facility: HOSPITAL | Age: 55
End: 2025-03-11

## 2025-03-11 ENCOUNTER — OUTPATIENT (OUTPATIENT)
Dept: OUTPATIENT SERVICES | Facility: HOSPITAL | Age: 55
LOS: 1 days | End: 2025-03-11
Payer: MEDICAID

## 2025-03-11 VITALS
DIASTOLIC BLOOD PRESSURE: 70 MMHG | SYSTOLIC BLOOD PRESSURE: 118 MMHG | WEIGHT: 250 LBS | BODY MASS INDEX: 38.01 KG/M2 | HEART RATE: 74 BPM | OXYGEN SATURATION: 100 %

## 2025-03-11 DIAGNOSIS — Z86.018 PERSONAL HISTORY OF OTHER BENIGN NEOPLASM: Chronic | ICD-10-CM

## 2025-03-11 DIAGNOSIS — I50.23 ACUTE ON CHRONIC SYSTOLIC (CONGESTIVE) HEART FAILURE: ICD-10-CM

## 2025-03-11 DIAGNOSIS — Z98.89 OTHER SPECIFIED POSTPROCEDURAL STATES: Chronic | ICD-10-CM

## 2025-03-11 DIAGNOSIS — I25.10 ATHEROSCLEROTIC HEART DISEASE OF NATIVE CORONARY ARTERY WITHOUT ANGINA PECTORIS: ICD-10-CM

## 2025-03-11 DIAGNOSIS — Z95.2 PRESENCE OF PROSTHETIC HEART VALVE: Chronic | ICD-10-CM

## 2025-03-11 PROCEDURE — G0463: CPT

## 2025-03-11 PROCEDURE — 93005 ELECTROCARDIOGRAM TRACING: CPT

## 2025-03-12 DIAGNOSIS — I50.23 ACUTE ON CHRONIC SYSTOLIC (CONGESTIVE) HEART FAILURE: ICD-10-CM

## 2025-06-03 ENCOUNTER — NON-APPOINTMENT (OUTPATIENT)
Age: 55
End: 2025-06-03

## 2025-06-03 ENCOUNTER — OUTPATIENT (OUTPATIENT)
Dept: OUTPATIENT SERVICES | Facility: HOSPITAL | Age: 55
LOS: 1 days | End: 2025-06-03
Payer: MEDICAID

## 2025-06-03 ENCOUNTER — APPOINTMENT (OUTPATIENT)
Dept: CARDIOLOGY | Facility: HOSPITAL | Age: 55
End: 2025-06-03

## 2025-06-03 VITALS — SYSTOLIC BLOOD PRESSURE: 148 MMHG | HEART RATE: 68 BPM | OXYGEN SATURATION: 98 % | DIASTOLIC BLOOD PRESSURE: 87 MMHG

## 2025-06-03 DIAGNOSIS — Z95.2 PRESENCE OF PROSTHETIC HEART VALVE: Chronic | ICD-10-CM

## 2025-06-03 DIAGNOSIS — I50.23 ACUTE ON CHRONIC SYSTOLIC (CONGESTIVE) HEART FAILURE: ICD-10-CM

## 2025-06-03 DIAGNOSIS — Z86.018 PERSONAL HISTORY OF OTHER BENIGN NEOPLASM: Chronic | ICD-10-CM

## 2025-06-03 DIAGNOSIS — Z98.89 OTHER SPECIFIED POSTPROCEDURAL STATES: Chronic | ICD-10-CM

## 2025-06-03 DIAGNOSIS — I25.10 ATHEROSCLEROTIC HEART DISEASE OF NATIVE CORONARY ARTERY WITHOUT ANGINA PECTORIS: ICD-10-CM

## 2025-06-03 PROCEDURE — G0463: CPT

## 2025-06-03 PROCEDURE — 93005 ELECTROCARDIOGRAM TRACING: CPT

## 2025-06-04 ENCOUNTER — RX RENEWAL (OUTPATIENT)
Age: 55
End: 2025-06-04

## 2025-06-06 ENCOUNTER — RX RENEWAL (OUTPATIENT)
Age: 55
End: 2025-06-06

## 2025-06-06 RX ORDER — SOTALOL HYDROCHLORIDE 120 MG/1
120 TABLET ORAL
Qty: 180 | Refills: 5 | Status: ACTIVE | COMMUNITY
Start: 2025-06-06 | End: 1900-01-01

## 2025-06-06 RX ORDER — ASPIRIN 81 MG/1
81 TABLET, COATED ORAL
Qty: 30 | Refills: 6 | Status: ACTIVE | COMMUNITY
Start: 2025-06-06 | End: 1900-01-01

## 2025-06-10 ENCOUNTER — RX RENEWAL (OUTPATIENT)
Age: 55
End: 2025-06-10

## 2025-06-18 ENCOUNTER — NON-APPOINTMENT (OUTPATIENT)
Age: 55
End: 2025-06-18

## 2025-06-18 ENCOUNTER — APPOINTMENT (OUTPATIENT)
Dept: ELECTROPHYSIOLOGY | Facility: CLINIC | Age: 55
End: 2025-06-18

## 2025-06-18 VITALS
HEART RATE: 71 BPM | BODY MASS INDEX: 37.44 KG/M2 | RESPIRATION RATE: 16 BRPM | OXYGEN SATURATION: 99 % | WEIGHT: 247 LBS | DIASTOLIC BLOOD PRESSURE: 85 MMHG | HEIGHT: 68 IN | SYSTOLIC BLOOD PRESSURE: 126 MMHG

## 2025-06-18 PROCEDURE — 99214 OFFICE O/P EST MOD 30 MIN: CPT | Mod: 25

## 2025-06-18 PROCEDURE — 93000 ELECTROCARDIOGRAM COMPLETE: CPT

## 2025-06-18 RX ORDER — WARFARIN SODIUM 7.5 MG/1
7.5 TABLET ORAL DAILY
Refills: 0 | Status: ACTIVE | COMMUNITY

## 2025-06-30 ENCOUNTER — RX RENEWAL (OUTPATIENT)
Age: 55
End: 2025-06-30